# Patient Record
Sex: FEMALE | Race: BLACK OR AFRICAN AMERICAN | NOT HISPANIC OR LATINO | URBAN - METROPOLITAN AREA
[De-identification: names, ages, dates, MRNs, and addresses within clinical notes are randomized per-mention and may not be internally consistent; named-entity substitution may affect disease eponyms.]

---

## 2017-01-06 ENCOUNTER — OFFICE VISIT (OUTPATIENT)
Dept: OBSTETRICS AND GYNECOLOGY | Facility: CLINIC | Age: 27
End: 2017-01-06

## 2017-01-06 VITALS
RESPIRATION RATE: 14 BRPM | DIASTOLIC BLOOD PRESSURE: 72 MMHG | SYSTOLIC BLOOD PRESSURE: 106 MMHG | WEIGHT: 197 LBS | BODY MASS INDEX: 36.03 KG/M2

## 2017-01-06 DIAGNOSIS — Z97.5 CONTRACEPTION, DEVICE INTRAUTERINE: Primary | ICD-10-CM

## 2017-01-06 PROCEDURE — 58300 INSERT INTRAUTERINE DEVICE: CPT | Performed by: OBSTETRICS & GYNECOLOGY

## 2017-01-06 NOTE — PROGRESS NOTES
"IUD Insertion    Patient's last menstrual period was 01/27/2016 (approximate).    Date of procedure:  1/6/2017    Risks and benefits discussed? yes  All questions answered? yes  Consents given by The patient  Written consent obtained? yes    Local anesthesia used:  no    Procedure documentation:    After verifying the patient had a low probability of being pregnant and met the criteria for insertion, a sterile speculum has placed and the cervix was cleansed with an antiseptic solution.  Vaginal discharge was scant.  The anterior lip of the cervix was grasped with a tenaculum and the uterine cavity was gently sounded. There was no difficulty passing the sound through the cervix.  Cervical dilation did not need to be performed prior to placing the IUD.  The uterus was anteverted and sounded to 9 cms.  The Mirena was then prepared per the manufacturers instructions.    The Mirena was advanced to a point 2 cms from the fundus and then the arms were released from the sheath.  The device was advanced to the fundus and the device was released fully from the sheath.. The string was cut 2 cms in length.  Bleeding from the cervix was scant.    She tolerated the procedure without any difficulty.     Post procedure instructions: It was reviewed with [unfilled] that the Mirena will not alter the timing of when she bleeds but it may decrease the quantity of flow and cramps.  Roughly 30% of people will be amenorrheic over time.  Efficacy rate of 99.2% over 5 years was discussed.  Spontaneous expulsion rate of 1-2% was also discussed.  If she has any issue with fever or excessive bleeding or pain she is to call the office immediately.  Otherwise I would like to see her back in 6 weeks with an ultrasound to confirm correct placement.    This note was electronically signed.    Troy Cornell M.D.  January 6, 2017     ADDENDUM    Also, c/o of \"hair bump\" for ~ 1 week on left labia  The following tests were ordered today: HSV.  It " was explained to Swapnil that all lab test should be back within the one week after they are performed. She will be notified about the results, regardless of the findings. If she has not been contacted by the office within 2 weeks after the test has been performed, it is her responsibility to contact us to learn about her results.

## 2017-01-06 NOTE — MR AVS SNAPSHOT
Swapnil Hale   2017 9:50 AM   Office Visit    Dept Phone:  [phone]   Encounter #:  [id number]    Provider:  Troy Cornell MD   Department:  [facility]                Your Full Care Plan              Your Updated Medication List          This list is accurate as of: 17 10:55 AM.  Always use your most recent med list.                CITRANATAL ASSURE 35-1 & 300 MG tablet               You Were Diagnosed With        Codes Comments    Contraception, device intrauterine    -  Primary ICD-10-CM: Z97.5  ICD-9-CM: V45.51       Medications to be Given to You by a Medical Professional     Due       Frequency    (none) levonorgestrel (MIRENA) 20 MCG/24HR IUD  Once      Instructions     None    Patient Instructions History      Upcoming Appointments     Visit Type Date Time Department    IUD 2017  9:50 AM [facility]    GYN FOLLOW UP 2017  9:40 AM [facility]    US JEAN[facility]TE NON-OB TRANSVAGINAL 2017  9:00 AM  [facility]  [facility]      [employer] Signup     [facility] [employer] allows you to send messages to your doctor, view your test results, renew your prescriptions, schedule appointments, and more. To sign up, go to [employer] and click on the Sign Up Now link in the New User? box. Enter your [employer] Activation Code exactly as it appears below along with the last four digits of your Social Security Number and your Date of Birth () to complete the sign-up process. If you do not sign up before the expiration date, you must request a new code.    [employer] Activation Code: [email or web address]  Expires: 2017  5:34 AM    If you have questions, you can email [email or web address] or call [phone] to talk to our [employer] staff. Remember, [employer] is NOT to be used for urgent needs. For medical emergencies, dial 911.               Other Info from Your Visit           Your Appointments     Feb 17, 2017  9:00 AM EST   US jeannette non-ob transvaginal with JEANNET[facility] US 1   [facility]  [facility] [place] ([facility]              No prep. Arrive 30 minutes before your appointment.            Feb 17, 2017  9:40 AM EST   GYN FOLLOW UP with Troy Cornell MD   [facility] [place] (--)    [address]  [facility] [id number] [phone]              Allergies     No Known Allergies      Reason for Visit     Contraception insert of mirena      Vital Signs     Blood Pressure Respirations Weight Last Menstrual Period Breastfeeding? Body Mass Index    106/72 14 197 lb (89.4 kg) 01/27/2016 (Approximate) No 36.03 kg/m2    Smoking Status                   Never Smoker           Problems and Diagnoses Noted     Uses birth control      Medications Administered     levonorgestrel (MIRENA) 20 MCG/24HR IUD

## 2017-01-10 ENCOUNTER — DOCUMENTATION (OUTPATIENT)
Dept: OBSTETRICS AND GYNECOLOGY | Facility: CLINIC | Age: 27
End: 2017-01-10

## 2017-01-11 ENCOUNTER — TELEPHONE (OUTPATIENT)
Dept: OBSTETRICS AND GYNECOLOGY | Facility: CLINIC | Age: 27
End: 2017-01-11

## 2017-01-11 NOTE — TELEPHONE ENCOUNTER
This could be normal.  If he becomes significantly worse, she does need to get in for an ultrasound prior to the 6 weeks as scheduled.  If she has fever, unpredictable bleeding or some other symptoms, she also should be seen for an ultrasound prior to 6 weeks

## 2017-01-11 NOTE — TELEPHONE ENCOUNTER
Pt called.  She has been cramping ever since she had Mirena inserted on January 6th.  She takes Advil and it takes the edge off, but it never takes it totally away.  She sleeps OK and works without problems. She has no bleeding or fever. Is this within normal limits?

## 2017-01-19 ENCOUNTER — TELEPHONE (OUTPATIENT)
Dept: OBSTETRICS AND GYNECOLOGY | Facility: CLINIC | Age: 27
End: 2017-01-19

## 2017-01-19 NOTE — TELEPHONE ENCOUNTER
----- Message from [place] sent at 1/19/2017  2:12 PM EST -----  Regarding: BLEEDING AFTER MIRENA PLACEMENT  Contact: [phone]  DR GIBBONS PT HAD MIRENA PLACED RECENTLY AND SHE HAS BEEN BLEEDING FOR A LITTLE OVER A WEEK AND SOME CRAMPING      I would let her know that a little bit of bleeding and cramping is usually pretty normal and often resolves itself.  If she is having significant issues with cramps, bleeding or fever she needs to be seen to have it evaluated.

## 2017-02-17 ENCOUNTER — OFFICE VISIT (OUTPATIENT)
Dept: OBSTETRICS AND GYNECOLOGY | Facility: CLINIC | Age: 27
End: 2017-02-17

## 2017-02-17 VITALS — BODY MASS INDEX: 35.67 KG/M2 | RESPIRATION RATE: 14 BRPM | WEIGHT: 195 LBS

## 2017-02-17 DIAGNOSIS — Z97.5 CONTRACEPTION, DEVICE INTRAUTERINE: Primary | ICD-10-CM

## 2017-02-17 DIAGNOSIS — N92.6 IRREGULAR MENSES: ICD-10-CM

## 2017-02-17 PROCEDURE — 99213 OFFICE O/P EST LOW 20 MIN: CPT | Performed by: OBSTETRICS & GYNECOLOGY

## 2017-02-17 NOTE — PROGRESS NOTES
Subjective   Chief Complaint   Patient presents with   • Imaging Only     placement of iud     Swapnil Hale is a 26 y.o. year old  presenting to be seen for follow-up of her recently placed Mirena.  Since the time of insertion flow is light but constant.    OTHER COMPLAINTS:  none       Objective   Visit Vitals   • Resp 14   • Wt 195 lb (88.5 kg)   • BMI 35.67 kg/m2       Imaging   Pelvic ultrasound images independantly reviewed - There is an appropriate placed endometrial IUD       Assessment   1. Appropriately placed IUD  2. Irregular menses     Plan   1. Explained to the Mirena will not regulate cycles in somebody who is anovulatory.  2. Offered short course of oral contraceptives to see if this improves flow.  To begin this on .  3. Follow up for annual exam 3 months    New Medications Ordered This Visit   Medications   • levonorgestrel-ethinyl estradiol (ENPRESSE-28) per tablet     Sig: Take 1 tablet by mouth Daily.     Dispense:  28 tablet     Refill:  2          Total time spent today with Swapnil  was 20 minutes (level 3).  Greater than 50% of the time was spent coordinating care, answering her questions and counseling regarding pathophysiology of her presenting problem along with plans for any diagnositc work-up and treatment.    This note was electronically signed.    Troy Cornell M.D.  2017

## 2017-05-04 ENCOUNTER — OFFICE VISIT (OUTPATIENT)
Dept: OBSTETRICS AND GYNECOLOGY | Facility: CLINIC | Age: 27
End: 2017-05-04

## 2017-05-04 VITALS
SYSTOLIC BLOOD PRESSURE: 110 MMHG | WEIGHT: 198.4 LBS | DIASTOLIC BLOOD PRESSURE: 70 MMHG | RESPIRATION RATE: 14 BRPM | HEART RATE: 87 BPM | BODY MASS INDEX: 36.51 KG/M2 | OXYGEN SATURATION: 98 % | HEIGHT: 62 IN

## 2017-05-04 DIAGNOSIS — Z01.419 WELL WOMAN EXAM WITH ROUTINE GYNECOLOGICAL EXAM: Primary | ICD-10-CM

## 2017-05-04 DIAGNOSIS — D50.9 IRON DEFICIENCY ANEMIA, UNSPECIFIED IRON DEFICIENCY ANEMIA TYPE: ICD-10-CM

## 2017-05-04 DIAGNOSIS — Z30.09 GENERAL COUNSELING AND ADVICE ON FEMALE CONTRACEPTION: ICD-10-CM

## 2017-05-04 PROCEDURE — 99395 PREV VISIT EST AGE 18-39: CPT | Performed by: OBSTETRICS & GYNECOLOGY

## 2017-05-15 ENCOUNTER — TELEPHONE (OUTPATIENT)
Dept: OBSTETRICS AND GYNECOLOGY | Facility: CLINIC | Age: 27
End: 2017-05-15

## 2017-05-27 ENCOUNTER — RESULTS ENCOUNTER (OUTPATIENT)
Dept: OBSTETRICS AND GYNECOLOGY | Facility: CLINIC | Age: 27
End: 2017-05-27

## 2017-05-27 DIAGNOSIS — Z01.419 WELL WOMAN EXAM WITH ROUTINE GYNECOLOGICAL EXAM: ICD-10-CM

## 2017-05-31 ENCOUNTER — TELEPHONE (OUTPATIENT)
Dept: OBSTETRICS AND GYNECOLOGY | Facility: CLINIC | Age: 27
End: 2017-05-31

## 2017-06-21 ENCOUNTER — OFFICE VISIT (OUTPATIENT)
Dept: INTERNAL MEDICINE | Facility: CLINIC | Age: 27
End: 2017-06-21

## 2017-06-21 VITALS
SYSTOLIC BLOOD PRESSURE: 108 MMHG | HEIGHT: 63 IN | DIASTOLIC BLOOD PRESSURE: 62 MMHG | WEIGHT: 194.8 LBS | BODY MASS INDEX: 34.52 KG/M2 | TEMPERATURE: 97.6 F

## 2017-06-21 DIAGNOSIS — R63.5 ABNORMAL WEIGHT GAIN: Primary | ICD-10-CM

## 2017-06-21 DIAGNOSIS — H65.02 ACUTE SEROUS OTITIS MEDIA OF LEFT EAR, RECURRENCE NOT SPECIFIED: ICD-10-CM

## 2017-06-21 PROCEDURE — 99203 OFFICE O/P NEW LOW 30 MIN: CPT | Performed by: INTERNAL MEDICINE

## 2017-06-21 RX ORDER — FLUCONAZOLE 150 MG/1
150 TABLET ORAL ONCE
Qty: 1 TABLET | Refills: 0 | Status: SHIPPED | OUTPATIENT
Start: 2017-06-21 | End: 2017-06-21

## 2017-06-21 RX ORDER — AMOXICILLIN AND CLAVULANATE POTASSIUM 875; 125 MG/1; MG/1
1 TABLET, FILM COATED ORAL 2 TIMES DAILY
Qty: 20 TABLET | Refills: 0 | Status: SHIPPED | OUTPATIENT
Start: 2017-06-21 | End: 2017-07-21

## 2017-06-21 RX ORDER — BUPROPION HYDROCHLORIDE 150 MG/1
150 TABLET ORAL DAILY
Qty: 30 TABLET | Refills: 5 | Status: SHIPPED | OUTPATIENT
Start: 2017-06-21 | End: 2018-09-07

## 2017-06-21 NOTE — PROGRESS NOTES
"Subjective   Swapnil Hale is a 26 y.o. female.     History of Present Illness     Here to establish    H/o anemia, though last hct was normal at 40. she is taking a flinstone regualrly    Weight - she feels like her weight fluctuates between 180-200, and has had trouble keeping it off. she was about 160 in highschool. She has been trying to go to  3d/week x several months, and also doing weight watchers through work since about 1/17.  Has lost about 5 lbs so far.    She had her son in 11/16 and has noticed ear on L pulsating sound on and off, not as often now, worse when she was pregnant. since childhood, she had some R hearing loss, though no h/o recurrent infections. L ear has not hurt, but at times she will feel a HA when she has the pulsation. No hearing loss on the L side    The following portions of the patient's history were reviewed and updated as appropriate: allergies, current medications, past family history, past medical history, past social history, past surgical history and problem list.    Review of Systems   Constitutional: Positive for unexpected weight change (unable to lose). Negative for activity change, appetite change and fever.   HENT: Positive for hearing loss (R side) and tinnitus (l ear).    Gastrointestinal:        Sometimes goes back and forth w/ loose stool and constipation   Skin: Negative.    Neurological: Positive for headaches (assocaited w/ her tinnitus).   Psychiatric/Behavioral: Negative.        Objective   Blood pressure 108/62, temperature 97.6 °F (36.4 °C), temperature source Temporal Artery , height 62.6\" (159 cm), weight 194 lb 12.8 oz (88.4 kg), not currently breastfeeding.  Physical Exam   Constitutional: She is oriented to person, place, and time. She appears well-developed and well-nourished. No distress.   HENT:   Head: Normocephalic and atraumatic.   Right Ear: External ear normal.   Left Ear: External ear normal.   Mouth/Throat: No oropharyngeal exudate.   L tm " slightly erythematous. Small amount of cerumen. R tm normal   Eyes: Conjunctivae and EOM are normal. Pupils are equal, round, and reactive to light.   Neck: Normal range of motion. Neck supple. No thyromegaly present.   No bruits   Cardiovascular: Normal rate, regular rhythm and normal heart sounds.  Exam reveals no gallop and no friction rub.    No murmur heard.  Pulmonary/Chest: Effort normal and breath sounds normal. No respiratory distress. She has no wheezes.   Abdominal: Soft. Bowel sounds are normal.   Neurological: She is alert and oriented to person, place, and time.   Skin: Skin is warm and dry. She is not diaphoretic.   Psychiatric: She has a normal mood and affect. Her behavior is normal. Judgment and thought content normal.       Assessment/Plan   Swapnil was seen today for establish care and weight gain.    Diagnoses and all orders for this visit:    Abnormal weight gain - will check thyroid labs. Will try wellbutrin xl, side effects reviewed. Continue working on healthy diet/exercise. Ihandout on healthy weight loss given to pt  -     buPROPion XL (WELLBUTRIN XL) 150 MG 24 hr tablet; Take 1 tablet by mouth Daily.  -     Comprehensive Metabolic Panel; Future  -     TSH; Future  -     T4, Free; Future    Acute serous otitis media of left ear, recurrence not specified - tm is red though pt has only had the tinnitus, not pain. Will do round of antibiotics and have her return in 2-3 weeks and see how TM looks  -     amoxicillin-clavulanate (AUGMENTIN) 875-125 MG per tablet; Take 1 tablet by mouth 2 (Two) Times a Day.  -     Comprehensive Metabolic Panel; Future  -     TSH; Future  -     T4, Free; Future    Other orders - if needed after antibiotic  -     fluconazole (DIFLUCAN) 150 MG tablet; Take 1 tablet by mouth 1 (One) Time for 1 dose.

## 2017-06-29 ENCOUNTER — TELEPHONE (OUTPATIENT)
Dept: OBSTETRICS AND GYNECOLOGY | Facility: CLINIC | Age: 27
End: 2017-06-29

## 2017-07-21 ENCOUNTER — OFFICE VISIT (OUTPATIENT)
Dept: OBSTETRICS AND GYNECOLOGY | Facility: CLINIC | Age: 27
End: 2017-07-21

## 2017-07-21 VITALS
HEART RATE: 67 BPM | OXYGEN SATURATION: 99 % | SYSTOLIC BLOOD PRESSURE: 110 MMHG | DIASTOLIC BLOOD PRESSURE: 70 MMHG | BODY MASS INDEX: 36.66 KG/M2 | RESPIRATION RATE: 14 BRPM | HEIGHT: 62 IN | WEIGHT: 199.2 LBS

## 2017-07-21 DIAGNOSIS — Z11.3 ROUTINE SCREENING FOR STI (SEXUALLY TRANSMITTED INFECTION): Primary | ICD-10-CM

## 2017-07-21 PROCEDURE — 99213 OFFICE O/P EST LOW 20 MIN: CPT | Performed by: OBSTETRICS & GYNECOLOGY

## 2017-07-21 RX ORDER — METRONIDAZOLE 7.5 MG/G
GEL VAGINAL 2 TIMES DAILY
Qty: 70 G | Refills: 1 | Status: SHIPPED | OUTPATIENT
Start: 2017-07-21 | End: 2017-07-26

## 2017-07-21 NOTE — PROGRESS NOTES
Subjective   Chief Complaint   Patient presents with   • Follow-up     Desires STD screen     Swapnil Hale is a 26 y.o. year old  presenting to be seen for evaluation of an abnormal vaginal discharge. The discharge is watery and yellow.  Her symptoms have been present for 6 day(s).  Additional she has noticed local irritation and odor.    Prior to the onset of symptoms she was not on systemic antibiotics.  She has not recently changed soaps/detergents/toilet tissue.  Prior to this visit, she has used nothing in an attempt to improve her symptoms.    SEXUAL Hx:  She is currently sexually active.  In the past year there has not been new sexual partners.    Condoms are not typically used.  She would like to be screened for STD's at today's exam.  Current birth control method: IUD - Mirena.  She is happy with her current method of contraception and does not want to discuss alternative methods of contraception..  MENSTRUAL Hx:  Patient's last menstrual period was 2017 (exact date).  In the past 6 months her cycles have been regular, predictable and occur monthly.   Her menstrual flow is normal.   Each month on average there are roughly  0 days of very heavy flow.    Intermenstrual bleeding is absent.    Post-coital bleeding is absent.  Dysmenorrhea: is not affecting her activities of daily living  PMS: is not affecting her activities of daily living  OTHER COMPLAINTS:  Nothing else    The following portions of the patient's history were reviewed and updated as appropriate:current medications and allergies    Review of Systems  Constitutional POS: nothing reported    NEG: anorexia or night sweats   Gastointestinal POS: nothing reported    NEG: bloating, change in bowel habits, melena or reflux symptoms   Genitourinary POS: per HPI    NEG: dysuria or hematuria   Integument POS: nothing reported    NEG: moles that are changing in size, shape, color or rashes        Objective   /70  Pulse 67  Resp 14  " Ht 62\" (157.5 cm)  Wt 199 lb 3.2 oz (90.4 kg)  LMP 2017 (Exact Date)  SpO2 99%  Breastfeeding? No  BMI 36.43 kg/m2    General:  well developed; well nourished  no acute distress   Skin:  No suspicious lesions seen   Pelvis: Clinical staff was present for exam  External genitalia:  normal appearance of the external genitalia including Bartholin's and [place]'s glands.  :  urethral meatus normal; urethral hypermobility is absent.  Vaginal:  normal pink mucosa without prolapse or lesions. discharge present -  watery, yellow and scant. One swab collected;  Cervix:  normal appearance. IUD string present - 2 cms in length;     Lab Review   No data reviewed    Imaging   No data reviewed       Assessment   1. Vaginitis  2. STI screening     Plan   1. Await culture and lab results for plan of care.       New Medications Ordered This Visit   Medications   • levonorgestrel (MIRENA, 52 MG,) 20 MCG/24HR IUD     Si each by Intrauterine route 1 (One) Time.   • metroNIDAZOLE (METROGEL VAGINAL) 0.75 % vaginal gel     Sig: Insert  into the vagina 2 (Two) Times a Day for 5 days.     Dispense:  70 g     Refill:  1          This note was electronically signed.    Baldo Henry M.D. LINDA  2017    "

## 2017-07-24 ENCOUNTER — TELEPHONE (OUTPATIENT)
Dept: OBSTETRICS AND GYNECOLOGY | Facility: CLINIC | Age: 27
End: 2017-07-24

## 2017-07-24 LAB
HBV SURFACE AG SERPL QL IA: NEGATIVE
HCV AB S/CO SERPL IA: <0.1 S/CO RATIO (ref 0–0.9)
HIV 1+2 AB+HIV1 P24 AG SERPL QL IA: NON REACTIVE
Lab: NORMAL
RPR SER QL: NON REACTIVE

## 2017-07-28 RX ORDER — AZITHROMYCIN 500 MG/1
1000 TABLET, FILM COATED ORAL ONCE
Qty: 2 TABLET | Refills: 0 | Status: SHIPPED | OUTPATIENT
Start: 2017-07-28 | End: 2017-07-28

## 2017-08-31 RX ORDER — METRONIDAZOLE 7.5 MG/G
GEL VAGINAL 2 TIMES DAILY
Qty: 70 G | Refills: 1 | Status: SHIPPED | OUTPATIENT
Start: 2017-08-31 | End: 2017-09-08

## 2017-09-08 ENCOUNTER — OFFICE VISIT (OUTPATIENT)
Dept: OBSTETRICS AND GYNECOLOGY | Facility: CLINIC | Age: 27
End: 2017-09-08

## 2017-09-08 VITALS
SYSTOLIC BLOOD PRESSURE: 110 MMHG | OXYGEN SATURATION: 98 % | HEART RATE: 76 BPM | RESPIRATION RATE: 14 BRPM | HEIGHT: 62 IN | BODY MASS INDEX: 36.99 KG/M2 | DIASTOLIC BLOOD PRESSURE: 72 MMHG | WEIGHT: 201 LBS

## 2017-09-08 DIAGNOSIS — N92.1 MENORRHAGIA WITH IRREGULAR CYCLE: Primary | ICD-10-CM

## 2017-09-08 DIAGNOSIS — Z11.3 SCREEN FOR STD (SEXUALLY TRANSMITTED DISEASE): ICD-10-CM

## 2017-09-08 LAB — HCG INTACT+B SERPL-ACNC: <5 MIU/ML

## 2017-09-08 PROCEDURE — 99213 OFFICE O/P EST LOW 20 MIN: CPT | Performed by: OBSTETRICS & GYNECOLOGY

## 2017-09-13 ENCOUNTER — RESULTS ENCOUNTER (OUTPATIENT)
Dept: OBSTETRICS AND GYNECOLOGY | Facility: CLINIC | Age: 27
End: 2017-09-13

## 2017-09-13 DIAGNOSIS — N92.1 MENORRHAGIA WITH IRREGULAR CYCLE: ICD-10-CM

## 2017-09-14 NOTE — PROGRESS NOTES
"Subjective   Chief Complaint   Patient presents with   • Follow-up     Desires STD screen     Swapnil Hale is a 27 y.o. year old .  Patient's last menstrual period was 2017 (approximate).  She presents to be seen For test of cure for chlamydia.  She states she has not been sexually active since her positive screen.  She also notes irregular bleeding has persisted since her Mirena was placed in February.     OTHER COMPLAINTS:  Nothing else    The following portions of the patient's history were reviewed and updated as appropriate:current medications and allergies    Smoking status: Never Smoker                                                              Smokeless status: Never Used                        Review of Systems  Constitutional POS: nothing reported    NEG: anorexia or night sweats   Gastointestinal POS: nothing reported    NEG: bloating, change in bowel habits, melena or reflux symptoms   Genitourinary POS: see HPI    NEG: dysuria or hematuria   Integument POS: nothing reported    NEG: moles that are changing in size, shape, color or rashes   Breast POS: nothing reported    NEG: persistent breast lump, skin dimpling or nipple discharge         Objective   /72  Pulse 76  Resp 14  Ht 62\" (157.5 cm)  Wt 201 lb (91.2 kg)  LMP 2017 (Approximate) Comment: Mirena  SpO2 98%  Breastfeeding? No  BMI 36.76 kg/m2    General:  well developed; well nourished  no acute distress   Skin:  Not performed.   Thyroid: not examined   Lungs:  breathing is unlabored   Heart:  Not performed.   Breasts:  Not performed.   Abdomen: Not performed.   Pelvis: Clinical staff was present for exam  External genitalia:  normal appearance of the external genitalia including Bartholin's and [place]'s glands.  :  urethral meatus normal;  Vaginal:  normal pink mucosa without prolapse or lesions. One swabs collected  Cervix:  normal appearance. IUD string present - 2.5 cms in length;     Lab Review   No data " reviewed    Imaging   No data reviewed        Assessment   1. STI screening  2. Menorrhagia     Plan   1. Await one swab results for plan of care.  Patient given she reassurance that irregular bleeding can last for up to 1 year with levonorgestrel IUD placement      No orders of the defined types were placed in this encounter.         This note was electronically signed.    Baldo Henry M.D. LINDA  September 14, 2017

## 2017-09-25 ENCOUNTER — TELEPHONE (OUTPATIENT)
Dept: OBSTETRICS AND GYNECOLOGY | Facility: CLINIC | Age: 27
End: 2017-09-25

## 2017-09-25 NOTE — TELEPHONE ENCOUNTER
Dr. Henry patient  [phone] advised patient she needs an appointment. She is scheduled for Friday 9/29/2017 with Dr. Henry.

## 2017-09-28 ENCOUNTER — TELEPHONE (OUTPATIENT)
Dept: OBSTETRICS AND GYNECOLOGY | Facility: CLINIC | Age: 27
End: 2017-09-28

## 2017-09-29 ENCOUNTER — TELEPHONE (OUTPATIENT)
Dept: OBSTETRICS AND GYNECOLOGY | Facility: CLINIC | Age: 27
End: 2017-09-29

## 2017-10-12 ENCOUNTER — OFFICE VISIT (OUTPATIENT)
Dept: OBSTETRICS AND GYNECOLOGY | Facility: CLINIC | Age: 27
End: 2017-10-12

## 2017-10-12 VITALS
DIASTOLIC BLOOD PRESSURE: 72 MMHG | OXYGEN SATURATION: 98 % | HEIGHT: 62 IN | WEIGHT: 208.2 LBS | HEART RATE: 85 BPM | SYSTOLIC BLOOD PRESSURE: 110 MMHG | RESPIRATION RATE: 14 BRPM | BODY MASS INDEX: 38.31 KG/M2

## 2017-10-12 DIAGNOSIS — N76.0 ACUTE VAGINITIS: Primary | ICD-10-CM

## 2017-10-12 PROCEDURE — 99213 OFFICE O/P EST LOW 20 MIN: CPT | Performed by: OBSTETRICS & GYNECOLOGY

## 2017-10-12 RX ORDER — TERCONAZOLE 80 MG/1
80 SUPPOSITORY VAGINAL NIGHTLY
Qty: 3 SUPPOSITORY | Refills: 1 | Status: SHIPPED | OUTPATIENT
Start: 2017-10-12 | End: 2017-11-06

## 2017-10-13 NOTE — PROGRESS NOTES
Subjective   Chief Complaint   Patient presents with   • Follow-up     Desires STD screen and wants to discuss birth control options     Swapnil Hale is a 27 y.o. year old  presenting to be seen for evaluation of an abnormal vaginal discharge. The discharge is yellow, white and thick.  Her symptoms have been present for 2 week(s).  Additional she has noticed dyspareunia, local irritation and vulvar itching.    Prior to the onset of symptoms she was not on systemic antibiotics.  She has not recently changed soaps/detergents/toilet tissue.  Prior to this visit, she has used flagyl, OTC monistat in an attempt to improve her symptoms.    SEXUAL Hx:  She is currently sexually active.  In the past year there has not been new sexual partners.    Condoms are not typically used.  She would not like to be screened for STD's at today's exam.  Current birth control method: IUD - Mirena.  She is happy with her current method of contraception and does not want to discuss alternative methods of contraception..  MENSTRUAL Hx:  Patient's last menstrual period was 2017 (approximate).  In the past 6 months her cycles have been regular, predictable and occur monthly.   Her menstrual flow is normal.   Each month on average there are roughly  0 days of very heavy flow.    Intermenstrual bleeding is absent.    Post-coital bleeding is absent.  Dysmenorrhea: is not affecting her activities of daily living  PMS: is not affecting her activities of daily living  OTHER COMPLAINTS:  Nothing else    The following portions of the patient's history were reviewed and updated as appropriate:current medications and allergies    Review of Systems  Constitutional POS: nothing reported    NEG: anorexia or night sweats   Gastointestinal POS: nothing reported    NEG: bloating, change in bowel habits, melena or reflux symptoms   Genitourinary POS: nothing reported    NEG: dysuria or hematuria   Integument POS: nothing reported    NEG: moles  "that are changing in size, shape, color or rashes        Objective   /72  Pulse 85  Resp 14  Ht 62\" (157.5 cm)  Wt 208 lb 3.2 oz (94.4 kg)  LMP 09/29/2017 (Approximate) Comment: Mirena  SpO2 98%  Breastfeeding? No  BMI 38.08 kg/m2    General:  well developed; well nourished  no acute distress   Skin:  No suspicious lesions seen   Pelvis: Clinical staff was present for exam  External genitalia:  normal appearance of the external genitalia including Bartholin's and [place]'s glands.  :  urethral meatus normal;  Vaginal:  normal pink mucosa without prolapse or lesions. discharge present -  yellow, white, thick and copious. One swabs collected;     Lab Review   No data reviewed    Imaging   No data reviewed       Assessment   1. Vaginal candidiasis     Plan   1. Terazol for treatment. Will RTC as previously scheduled for annual exam or on an as needed basis      New Medications Ordered This Visit   Medications   • terconazole (TERAZOL 3) 80 MG vaginal suppository     Sig: Insert 1 suppository into the vagina Every Night.     Dispense:  3 suppository     Refill:  1          This note was electronically signed.    Baldo Henry M.D. MBA  October 12, 2017      "

## 2017-10-16 ENCOUNTER — TELEPHONE (OUTPATIENT)
Dept: OBSTETRICS AND GYNECOLOGY | Facility: CLINIC | Age: 27
End: 2017-10-16

## 2017-10-17 NOTE — TELEPHONE ENCOUNTER
Dr. Henry patient  [phone] called patient and gave her the results from her STI screen. Which was negative but she was positive for yeast infection. Per Dr. Henry he treated her for yeast at her last appointment. Patient notified

## 2017-11-06 ENCOUNTER — OFFICE VISIT (OUTPATIENT)
Dept: OBSTETRICS AND GYNECOLOGY | Facility: CLINIC | Age: 27
End: 2017-11-06

## 2017-11-06 VITALS
WEIGHT: 205.8 LBS | BODY MASS INDEX: 37.87 KG/M2 | SYSTOLIC BLOOD PRESSURE: 106 MMHG | HEIGHT: 62 IN | DIASTOLIC BLOOD PRESSURE: 72 MMHG | RESPIRATION RATE: 14 BRPM

## 2017-11-06 DIAGNOSIS — Z30.014 ENCOUNTER FOR INITIAL PRESCRIPTION OF INTRAUTERINE CONTRACEPTIVE DEVICE (IUD): ICD-10-CM

## 2017-11-06 DIAGNOSIS — Z30.09 GENERAL COUNSELING AND ADVICE ON FEMALE CONTRACEPTION: Primary | ICD-10-CM

## 2017-11-06 PROCEDURE — 58300 INSERT INTRAUTERINE DEVICE: CPT | Performed by: OBSTETRICS & GYNECOLOGY

## 2017-11-06 PROCEDURE — 58301 REMOVE INTRAUTERINE DEVICE: CPT | Performed by: OBSTETRICS & GYNECOLOGY

## 2017-11-06 RX ORDER — COPPER 313.4 MG/1
INTRAUTERINE DEVICE INTRAUTERINE ONCE
Status: COMPLETED | OUTPATIENT
Start: 2017-11-06 | End: 2017-11-06

## 2017-11-06 RX ADMIN — COPPER 1 EACH: 313.4 INTRAUTERINE DEVICE INTRAUTERINE at 11:53

## 2017-11-13 NOTE — PROGRESS NOTES
IUD Removal and Immediate Reinsertion    Patient's last menstrual period was 10/26/2017 (exact date).    Date of procedure:  11/06/2017    Risks and benefits discussed? yes  All questions answered? yes  Consents given by the patient  Written consent obtained? yes  Reason for removal: Side effect: Menorrhagia    Local anesthesia used:  no    Procedure documentation:    A speculum was placed in order to view the cervix.  A tenaculum did not need to be placed on the anterior cervical lip.  Cervical dilation did not need to be performed in order to access the string.  The IUD string was easily seen.  The string was grasped and the IUD was removed without difficulty.  The IUD did not appear to be adherent to the uterine cavity. It was removed intact.    A sterile speculum was replaced and the cervix was cleansed with an antiseptic solution.  Vaginal discharge was scant.  The anterior lip of the cervix was grasped with a tenaculum and the uterine cavity was gently sounded.  There was no difficulty passing the sound through the cervix.  Cervical dilation did not need to be performed prior to placing the IUD.  The uterus was midposition and sounded to 9 cms.  The ParaGard was then prepared per the manufacturers instructions.    The Paraguard was advanced through the cervical canal until the upper edge of the flange was flush with the external cervix.  The insertion camacho was held in place while the insertion tube was withdrawn.  I waited 10-15 seconds for the arms of the IUS to fully open and the devise to seat in the cavity.  The camacho was removed first followed by the insertion tube.. The string was cut 2 cms in length.  Bleeding from the cervix was scant.    She tolerated the procedure without any difficulty.     Post procedure instructions: It was reviewed that the Paraguard will not alter the timing of when she bleeds but it may increase the quantity of flow and cramps. Efficacy rate of > 98% over the 10 years was  discussed.  Spontaneous expulsion rate of 1-2% was also discussed.  If she has any issue with fever or excessive bleeding or pain she is to call the office immediately.  Otherwise I would like to see her back in 6 weeks with an ultrasound to confirm correct placement    This note was electronically signed.    Baldo Henry M.D. LINDA

## 2017-12-08 ENCOUNTER — OFFICE VISIT (OUTPATIENT)
Dept: OBSTETRICS AND GYNECOLOGY | Facility: CLINIC | Age: 27
End: 2017-12-08

## 2017-12-08 ENCOUNTER — LAB REQUISITION (OUTPATIENT)
Dept: LAB | Facility: HOSPITAL | Age: 27
End: 2017-12-08

## 2017-12-08 VITALS
HEIGHT: 62 IN | DIASTOLIC BLOOD PRESSURE: 80 MMHG | SYSTOLIC BLOOD PRESSURE: 116 MMHG | RESPIRATION RATE: 14 BRPM | OXYGEN SATURATION: 98 % | HEART RATE: 91 BPM | BODY MASS INDEX: 38.9 KG/M2 | WEIGHT: 211.4 LBS

## 2017-12-08 DIAGNOSIS — Z00.00 ROUTINE GENERAL MEDICAL EXAMINATION AT A HEALTH CARE FACILITY: ICD-10-CM

## 2017-12-08 DIAGNOSIS — Z11.3 ROUTINE SCREENING FOR STI (SEXUALLY TRANSMITTED INFECTION): ICD-10-CM

## 2017-12-08 DIAGNOSIS — Z30.431 IUD CHECK UP: Primary | ICD-10-CM

## 2017-12-08 DIAGNOSIS — Z11.3 ENCOUNTER FOR SCREENING FOR INFECTIONS WITH PREDOMINANTLY SEXUAL MODE OF TRANSMISSION: ICD-10-CM

## 2017-12-08 PROCEDURE — 36415 COLL VENOUS BLD VENIPUNCTURE: CPT | Performed by: OBSTETRICS & GYNECOLOGY

## 2017-12-08 PROCEDURE — 99213 OFFICE O/P EST LOW 20 MIN: CPT | Performed by: OBSTETRICS & GYNECOLOGY

## 2017-12-08 NOTE — PROGRESS NOTES
"Subjective   Chief Complaint   Patient presents with   • Follow-up     Paragard IUD string check, yellowish vaginal discharge with odor.     Swapnil Hale is a 27 y.o. year old  presenting to be seen for follow-up of her recently placed ParaGard.  Since the time of insertion flow is typically normal.  She has been sexually active.  Henefer has not been painful for her.   Henefer has not been painful for her partner.    OTHER COMPLAINTS:  Patient desires STI screening       Objective   /80  Pulse 91  Resp 14  Ht 157.5 cm (62\")  Wt 95.9 kg (211 lb 6.4 oz)  LMP 2017 (Exact Date) Comment: Paragard IUD  SpO2 98%  Breastfeeding? No  BMI 38.67 kg/m2    Imaging   No data reviewed       Assessment   1. IUD surveillance  2. STI screening     Plan   1. Patient satisfied with ParaGard as a contraceptive choice.  Await STI labs for plan of care.  Patient will return to clinic in 6 months for her annual exam and Pap smear      New Medications Ordered This Visit   Medications   • PARAGARD INTRAUTERINE COPPER IU     Sig: by Intrauterine route.          Total time spent today with Swapnil  was 20 minutes (level 3).  Greater than 50% of the time was spent coordinating care, answering her questions and counseling regarding pathophysiology of her presenting problem along with plans for any diagnositc work-up and treatment.    This note was electronically signed.    MD SERGIO AshrafA  2017  "

## 2017-12-09 LAB
HCV AB S/CO SERPL IA: <0.1 S/CO RATIO (ref 0–0.9)
HIV 1+2 AB+HIV1 P24 AG SERPL QL IA: NON REACTIVE
RHEUMATOID FACT SERPL-ACNC: <10 IU/ML (ref 0–13.9)
RPR SER QL: NON REACTIVE

## 2017-12-13 ENCOUNTER — TELEPHONE (OUTPATIENT)
Dept: OBSTETRICS AND GYNECOLOGY | Facility: CLINIC | Age: 27
End: 2017-12-13

## 2017-12-13 RX ORDER — METRONIDAZOLE 7.5 MG/G
GEL VAGINAL
Qty: 1 TUBE | Refills: 0 | Status: SHIPPED | OUTPATIENT
Start: 2017-12-13 | End: 2018-04-13

## 2017-12-13 NOTE — TELEPHONE ENCOUNTER
[phone] Dr. Henry patient calling requesting results from her STD screen. STD screen is negative but she has BV I will send in a prescription for Metrogel to her pharmacy. Patient notified, E-Rx sent to pharmacy.

## 2017-12-13 NOTE — TELEPHONE ENCOUNTER
DR FONSECA PATIENT    WOULD LIKE BLOOD WORK RESULTS FROM 12/8/17 VISIT WITH DR FONSECA    PLEASE CALL [phone]

## 2018-04-13 ENCOUNTER — OFFICE VISIT (OUTPATIENT)
Dept: OBSTETRICS AND GYNECOLOGY | Facility: CLINIC | Age: 28
End: 2018-04-13

## 2018-04-13 VITALS
WEIGHT: 212.2 LBS | HEART RATE: 96 BPM | DIASTOLIC BLOOD PRESSURE: 70 MMHG | RESPIRATION RATE: 14 BRPM | SYSTOLIC BLOOD PRESSURE: 102 MMHG | OXYGEN SATURATION: 99 % | HEIGHT: 62 IN | BODY MASS INDEX: 39.05 KG/M2

## 2018-04-13 DIAGNOSIS — N92.3 VAGINAL BLEEDING BETWEEN PERIODS: Primary | ICD-10-CM

## 2018-04-13 PROCEDURE — 99213 OFFICE O/P EST LOW 20 MIN: CPT | Performed by: OBSTETRICS & GYNECOLOGY

## 2018-04-13 NOTE — PROGRESS NOTES
"Subjective   Chief Complaint   Patient presents with   • Follow-up     Spotting bright red and irritated for about 2 weeks     Swapnil Hale is a 27 y.o. year old .  Patient's last menstrual period was 2018 (exact date).  She presents to be seen for 2 wks irreg, intermittent spotting. Bright red to brown in color. No clots or cramping.     OTHER COMPLAINTS:  Nothing else    The following portions of the patient's history were reviewed and updated as appropriate:current medications and allergies    Smoking status: Never Smoker                                                              Smokeless tobacco: Never Used                        Review of Systems  Constitutional POS: nothing reported    NEG: anorexia or night sweats   Gastointestinal POS: nothing reported    NEG: bloating, change in bowel habits, melena or reflux symptoms   Genitourinary POS: see HPI    NEG: dysuria or hematuria   Integument POS: nothing reported    NEG: moles that are changing in size, shape, color or rashes   Breast POS: nothing reported    NEG: persistent breast lump, skin dimpling or nipple discharge         Objective   /70   Pulse 96   Resp 14   Ht 157.5 cm (62\")   Wt 96.3 kg (212 lb 3.2 oz)   LMP 2018 (Exact Date) Comment: Paragard IUD  SpO2 99%   Breastfeeding? No   BMI 38.81 kg/m²     General:  well developed; well nourished  no acute distress   Skin:  No suspicious lesions seen   Thyroid: normal to inspection and palpation   Lungs:  breathing is unlabored   Heart:  regular rate and rhythm, S1, S2 normal, no murmur, click, rub or gallop   Breasts:  Not performed.   Abdomen: soft, non-tender; no masses  no umbilical or inginual hernias are present  no hepato-splenomegaly   Pelvis: Clinical staff was present for exam  External genitalia:  normal appearance of the external genitalia including Bartholin's and [place]'s glands.  :  urethral meatus normal;  Vaginal:  normal pink mucosa without prolapse " or lesions. blood present -  small amount and dark red;  Cervix:  normal appearance. blood is seen coming from external cervical os;     Lab Review   No data reviewed    Imaging   No data reviewed        Assessment   1. Breakthrough bleeding     Plan   1. Await ultrasound results for plan of care.      No orders of the defined types were placed in this encounter.         This note was electronically signed.    Baldo Henry M.D. LINDA

## 2018-04-18 ENCOUNTER — TELEPHONE (OUTPATIENT)
Dept: OBSTETRICS AND GYNECOLOGY | Facility: CLINIC | Age: 28
End: 2018-04-18

## 2018-04-18 NOTE — TELEPHONE ENCOUNTER
Provider Name  Dr Henry    Reason for Call  Wants urine and vaginal swab results    Pharmacy Name  [facility] [address]    Call Back Number  [phone]

## 2018-04-18 NOTE — TELEPHONE ENCOUNTER
Dr. Henry patient  [phone] returned patient's call and advised her that Dr. Henry did not do a vaginal swab on her. Dr. Henry just did a speculum and bi-manual exam. Also informed patient that someone had discarded her urine sample and if she's still having problems she can just stop by the office without an appointment to leave another sample. Patient states she has an appointment next week she will just wait until her appointment. I advised patient if her symptoms get worse for her to just come by office before her next appointment. Patient verbalized understanding.

## 2018-04-27 ENCOUNTER — OFFICE VISIT (OUTPATIENT)
Dept: OBSTETRICS AND GYNECOLOGY | Facility: CLINIC | Age: 28
End: 2018-04-27

## 2018-04-27 VITALS
HEIGHT: 62 IN | RESPIRATION RATE: 14 BRPM | HEART RATE: 69 BPM | DIASTOLIC BLOOD PRESSURE: 70 MMHG | WEIGHT: 213.8 LBS | OXYGEN SATURATION: 98 % | SYSTOLIC BLOOD PRESSURE: 104 MMHG | BODY MASS INDEX: 39.34 KG/M2

## 2018-04-27 DIAGNOSIS — B37.9 CANDIDIASIS: ICD-10-CM

## 2018-04-27 DIAGNOSIS — R30.0 DYSURIA: Primary | ICD-10-CM

## 2018-04-27 PROCEDURE — 87086 URINE CULTURE/COLONY COUNT: CPT | Performed by: OBSTETRICS & GYNECOLOGY

## 2018-04-27 PROCEDURE — 99213 OFFICE O/P EST LOW 20 MIN: CPT | Performed by: OBSTETRICS & GYNECOLOGY

## 2018-04-27 RX ORDER — FLUCONAZOLE 150 MG/1
150 TABLET ORAL DAILY
Qty: 1 TABLET | Refills: 2 | Status: SHIPPED | OUTPATIENT
Start: 2018-04-27 | End: 2018-09-07

## 2018-04-29 LAB
BACTERIA SPEC AEROBE CULT: NORMAL
BACTERIA SPEC AEROBE CULT: NORMAL

## 2018-05-14 ENCOUNTER — TELEPHONE (OUTPATIENT)
Dept: OBSTETRICS AND GYNECOLOGY | Facility: CLINIC | Age: 28
End: 2018-05-14

## 2018-05-16 RX ORDER — METRONIDAZOLE 7.5 MG/G
GEL VAGINAL 2 TIMES DAILY
Qty: 70 G | Refills: 3 | Status: SHIPPED | OUTPATIENT
Start: 2018-05-16 | End: 2018-09-07

## 2018-07-09 ENCOUNTER — OFFICE VISIT (OUTPATIENT)
Dept: OBSTETRICS AND GYNECOLOGY | Facility: CLINIC | Age: 28
End: 2018-07-09

## 2018-07-09 VITALS
WEIGHT: 210.4 LBS | SYSTOLIC BLOOD PRESSURE: 108 MMHG | RESPIRATION RATE: 14 BRPM | HEIGHT: 62 IN | BODY MASS INDEX: 38.72 KG/M2 | DIASTOLIC BLOOD PRESSURE: 72 MMHG

## 2018-07-09 DIAGNOSIS — Z30.432 ENCOUNTER FOR REMOVAL OF INTRAUTERINE CONTRACEPTIVE DEVICE (IUD): Primary | ICD-10-CM

## 2018-07-09 PROCEDURE — 58301 REMOVE INTRAUTERINE DEVICE: CPT | Performed by: OBSTETRICS & GYNECOLOGY

## 2018-07-09 RX ORDER — NORGESTIMATE AND ETHINYL ESTRADIOL 0.25-0.035
1 KIT ORAL DAILY
Qty: 28 TABLET | Refills: 12 | Status: SHIPPED | OUTPATIENT
Start: 2018-07-09 | End: 2018-11-16

## 2018-07-09 NOTE — PROGRESS NOTES
IUD Removal    Date of procedure:  7/9/2018    Risks and benefits discussed? yes  All questions answered? yes  Consents given by The patient  Written consent obtained? yes  Reason for removal: Side effect: Menorrhagia    Local anesthesia used:  no    Procedure documentation:    A speculum was placed in order to view the cervix.  A tenaculum did not need to be placed on the anterior cervical lip.  Cervical dilation did not need to be performed in order to access the string.  The IUD string was easily seen.  The string was grasped and the IUD was removed without difficulty.  The IUD did not appear to be adherent to the uterine cavity. It was removed intact.    She tolerated the procedure without any difficulty.     Post procedure instructions: Patient notified to call with heavy bleeding, fever or increasing pain.    Follow up for annual exam 3 week    This note was electronically signed.    Baldo Henry M.D. LINDA.  July 9, 2018

## 2018-07-27 ENCOUNTER — OFFICE VISIT (OUTPATIENT)
Dept: OBSTETRICS AND GYNECOLOGY | Facility: CLINIC | Age: 28
End: 2018-07-27

## 2018-07-27 VITALS
RESPIRATION RATE: 14 BRPM | HEIGHT: 62 IN | WEIGHT: 212.4 LBS | SYSTOLIC BLOOD PRESSURE: 110 MMHG | BODY MASS INDEX: 39.08 KG/M2 | DIASTOLIC BLOOD PRESSURE: 80 MMHG

## 2018-07-27 DIAGNOSIS — Z01.419 WELL WOMAN EXAM WITH ROUTINE GYNECOLOGICAL EXAM: Primary | ICD-10-CM

## 2018-07-27 PROCEDURE — 99395 PREV VISIT EST AGE 18-39: CPT | Performed by: OBSTETRICS & GYNECOLOGY

## 2018-07-27 RX ORDER — FLUCONAZOLE 200 MG/1
TABLET ORAL
COMMUNITY
Start: 2018-07-24 | End: 2018-09-07

## 2018-07-27 RX ORDER — AMOXICILLIN AND CLAVULANATE POTASSIUM 875; 125 MG/1; MG/1
TABLET, FILM COATED ORAL
COMMUNITY
Start: 2018-07-24 | End: 2018-09-07

## 2018-07-27 RX ORDER — CYANOCOBALAMIN 1000 UG/ML
INJECTION, SOLUTION INTRAMUSCULAR; SUBCUTANEOUS
COMMUNITY
Start: 2018-07-19 | End: 2019-05-21

## 2018-07-27 RX ORDER — ERGOCALCIFEROL 1.25 MG/1
CAPSULE ORAL
COMMUNITY
Start: 2018-07-19 | End: 2020-01-15

## 2018-09-07 ENCOUNTER — OFFICE VISIT (OUTPATIENT)
Dept: INTERNAL MEDICINE | Facility: CLINIC | Age: 28
End: 2018-09-07

## 2018-09-07 VITALS
SYSTOLIC BLOOD PRESSURE: 106 MMHG | TEMPERATURE: 97.2 F | WEIGHT: 212 LBS | HEIGHT: 62 IN | HEART RATE: 67 BPM | BODY MASS INDEX: 39.01 KG/M2 | DIASTOLIC BLOOD PRESSURE: 68 MMHG | OXYGEN SATURATION: 98 %

## 2018-09-07 DIAGNOSIS — J30.2 ACUTE SEASONAL ALLERGIC RHINITIS, UNSPECIFIED TRIGGER: ICD-10-CM

## 2018-09-07 DIAGNOSIS — R12 HEARTBURN: ICD-10-CM

## 2018-09-07 DIAGNOSIS — J02.9 SORE THROAT: Primary | ICD-10-CM

## 2018-09-07 PROCEDURE — 99214 OFFICE O/P EST MOD 30 MIN: CPT | Performed by: NURSE PRACTITIONER

## 2018-09-07 RX ORDER — OMEPRAZOLE 20 MG/1
20 TABLET, DELAYED RELEASE ORAL DAILY
Qty: 30 TABLET | Refills: 0 | Status: SHIPPED | OUTPATIENT
Start: 2018-09-07 | End: 2019-05-21

## 2018-09-07 RX ORDER — LEVOCETIRIZINE DIHYDROCHLORIDE 5 MG/1
5 TABLET, FILM COATED ORAL EVERY EVENING
Qty: 30 TABLET | Refills: 3 | Status: SHIPPED | OUTPATIENT
Start: 2018-09-07 | End: 2019-05-21

## 2018-09-07 NOTE — PROGRESS NOTES
Subjective   Swapnil Hale is a 28 y.o. female.     Sore Throat    This is a new problem. The current episode started 1 to 4 weeks ago (2 weeks). The problem has been unchanged (slightly worse at night). Neither side of throat is experiencing more pain than the other. There has been no fever. The pain is mild. Associated symptoms include trouble swallowing. Pertinent negatives include no abdominal pain, congestion, coughing, diarrhea, drooling, ear discharge, ear pain, headaches, hoarse voice, plugged ear sensation, neck pain, shortness of breath, stridor, swollen glands or vomiting. She has had no exposure to strep or mono. Treatments tried: benadryl, claritin, sinus colod med. The treatment provided no relief.   Heartburn   She complains of heartburn and a sore throat. She reports no abdominal pain, no belching, no chest pain, no choking, no coughing, no dysphagia, no early satiety, no globus sensation, no hoarse voice, no nausea, no stridor, no tooth decay, no water brash or no wheezing. This is a new problem. The current episode started 1 to 4 weeks ago. The problem occurs frequently. The problem has been unchanged. The heartburn is located in the substernum. The heartburn is of mild intensity. The heartburn does not wake her from sleep. The heartburn does not limit her activity. The heartburn doesn't change with position. The symptoms are aggravated by certain foods. Pertinent negatives include no anemia, fatigue, melena, muscle weakness, orthopnea or weight loss. Risk factors include obesity and caffeine use.      Pt has done 2 strep test on herself in the last 2 weeks and they were both negative. (works in medical office)    The following portions of the patient's history were reviewed and updated as appropriate: allergies, current medications, past family history, past medical history, past social history, past surgical history and problem list.    Review of Systems   Constitutional: Negative for appetite  change, chills, diaphoresis, fatigue, fever, unexpected weight change and weight loss.   HENT: Positive for rhinorrhea, sore throat and trouble swallowing. Negative for congestion, drooling, ear discharge, ear pain, hearing loss, hoarse voice, postnasal drip, sinus pain, sinus pressure and sneezing. Tinnitus: tenderness with swallowing.    Eyes: Negative for pain, redness and itching.   Respiratory: Negative for cough, choking, chest tightness, shortness of breath, wheezing and stridor.    Cardiovascular: Negative for chest pain and palpitations.   Gastrointestinal: Positive for heartburn. Negative for abdominal distention, abdominal pain, diarrhea, dysphagia, melena, nausea and vomiting.        Patient experiencing heartburn/acid reflux     Musculoskeletal: Negative for muscle weakness and neck pain.   Allergic/Immunologic: Positive for environmental allergies.   Neurological: Negative for headaches.       Objective   Physical Exam   Constitutional: She appears well-developed and well-nourished. No distress.   HENT:   Head: Normocephalic and atraumatic.   Right Ear: External ear normal.   Left Ear: External ear normal.   Nose: Mucosal edema (boggy) and rhinorrhea present.   Mouth/Throat: Posterior oropharyngeal erythema (cobblestone appearance) present. No oropharyngeal exudate or posterior oropharyngeal edema.   Eyes: Pupils are equal, round, and reactive to light. Conjunctivae are normal.   Neck: Neck supple. No thyromegaly present.   Cardiovascular: Normal rate, regular rhythm and normal heart sounds.    Pulmonary/Chest: Effort normal and breath sounds normal.   Abdominal: Soft. Bowel sounds are normal. She exhibits no distension and no mass. There is no tenderness. There is no rebound and no guarding. No hernia.   Skin: Skin is warm and dry. She is not diaphoretic.   Psychiatric: She has a normal mood and affect. Her behavior is normal.   Nursing note and vitals reviewed.      Assessment/Plan      Swapnil was  seen today for sore throat.    Diagnoses and all orders for this visit:    Sore throat    Heartburn  -     omeprazole OTC (PRILOSEC OTC) 20 MG EC tablet; Take 1 tablet by mouth Daily.    Acute seasonal allergic rhinitis, unspecified trigger  -     levocetirizine (XYZAL) 5 MG tablet; Take 1 tablet by mouth Every Evening.    Other orders  -     Cancel: POCT rapid strep A      Tylenol OTC if needed per bottle instructions  Warm salt water gargles.   Meds as above, AE discussed  GERD precautions: avoid trigger foods, small frequent meals, sit up past meals, no tight clothing over abdomen....  Return if symptoms worsen or fail to improve.  Plan of care discussed with pt. They verbalized understanding and agreement.

## 2018-10-02 ENCOUNTER — LAB REQUISITION (OUTPATIENT)
Dept: LAB | Facility: HOSPITAL | Age: 28
End: 2018-10-02

## 2018-10-02 ENCOUNTER — TELEPHONE (OUTPATIENT)
Dept: OBSTETRICS AND GYNECOLOGY | Facility: CLINIC | Age: 28
End: 2018-10-02

## 2018-10-02 DIAGNOSIS — Z34.90 PREGNANCY, UNSPECIFIED GESTATIONAL AGE: Primary | ICD-10-CM

## 2018-10-02 DIAGNOSIS — Z00.00 ROUTINE GENERAL MEDICAL EXAMINATION AT A HEALTH CARE FACILITY: ICD-10-CM

## 2018-10-02 LAB — HCG INTACT+B SERPL-ACNC: <5 MIU/ML

## 2018-10-02 PROCEDURE — 84702 CHORIONIC GONADOTROPIN TEST: CPT

## 2018-10-02 NOTE — TELEPHONE ENCOUNTER
Carl    Pt wants an HCG order sent to lab. She took pregnancy test yesterday, which showed a faint line. This morning she was bleeding and passed a big clot, and another test showed negative. Will you send order to the lab for hcg? Let pt know

## 2018-11-16 ENCOUNTER — OFFICE VISIT (OUTPATIENT)
Dept: INTERNAL MEDICINE | Facility: CLINIC | Age: 28
End: 2018-11-16

## 2018-11-16 VITALS
BODY MASS INDEX: 38.57 KG/M2 | SYSTOLIC BLOOD PRESSURE: 90 MMHG | WEIGHT: 209.6 LBS | HEIGHT: 62 IN | OXYGEN SATURATION: 98 % | TEMPERATURE: 99.1 F | DIASTOLIC BLOOD PRESSURE: 60 MMHG | HEART RATE: 67 BPM

## 2018-11-16 DIAGNOSIS — R51.9 HEADACHE, UNSPECIFIED HEADACHE TYPE: ICD-10-CM

## 2018-11-16 DIAGNOSIS — R41.3 MEMORY DISTURBANCE: ICD-10-CM

## 2018-11-16 DIAGNOSIS — R53.83 FATIGUE, UNSPECIFIED TYPE: ICD-10-CM

## 2018-11-16 DIAGNOSIS — E04.1 THYROID NODULE: ICD-10-CM

## 2018-11-16 DIAGNOSIS — H93.A9 PULSATILE TINNITUS: ICD-10-CM

## 2018-11-16 DIAGNOSIS — R27.8 DYSGRAPHIA: Primary | ICD-10-CM

## 2018-11-16 LAB
ALBUMIN SERPL-MCNC: 4.11 G/DL (ref 3.2–4.8)
ALBUMIN/GLOB SERPL: 1.4 G/DL (ref 1.5–2.5)
ALP SERPL-CCNC: 68 U/L (ref 25–100)
ALT SERPL-CCNC: 10 U/L (ref 7–40)
AST SERPL-CCNC: 17 U/L (ref 0–33)
BASOPHILS # BLD AUTO: 0.03 10*3/MM3 (ref 0–0.2)
BASOPHILS NFR BLD AUTO: 0.3 % (ref 0–1)
BILIRUB SERPL-MCNC: 0.4 MG/DL (ref 0.3–1.2)
BUN SERPL-MCNC: 10 MG/DL (ref 9–23)
BUN/CREAT SERPL: 10.9 (ref 7–25)
CALCIUM SERPL-MCNC: 9.4 MG/DL (ref 8.7–10.4)
CHLORIDE SERPL-SCNC: 104 MMOL/L (ref 99–109)
CO2 SERPL-SCNC: 30 MMOL/L (ref 20–31)
CREAT SERPL-MCNC: 0.92 MG/DL (ref 0.6–1.3)
EOSINOPHIL # BLD AUTO: 0.37 10*3/MM3 (ref 0–0.3)
EOSINOPHIL NFR BLD AUTO: 3.8 % (ref 0–3)
ERYTHROCYTE [DISTWIDTH] IN BLOOD BY AUTOMATED COUNT: 12.8 % (ref 11.3–14.5)
GLOBULIN SER CALC-MCNC: 2.9 GM/DL
GLUCOSE SERPL-MCNC: 87 MG/DL (ref 70–100)
HCT VFR BLD AUTO: 39.2 % (ref 34.5–44)
HGB BLD-MCNC: 12.3 G/DL (ref 11.5–15.5)
IMM GRANULOCYTES # BLD: 0.01 10*3/MM3 (ref 0–0.03)
IMM GRANULOCYTES NFR BLD: 0.1 % (ref 0–0.6)
LYMPHOCYTES # BLD AUTO: 3.05 10*3/MM3 (ref 0.6–4.8)
LYMPHOCYTES NFR BLD AUTO: 31.6 % (ref 24–44)
MCH RBC QN AUTO: 25.7 PG (ref 27–31)
MCHC RBC AUTO-ENTMCNC: 31.4 G/DL (ref 32–36)
MCV RBC AUTO: 82 FL (ref 80–99)
MONOCYTES # BLD AUTO: 0.68 10*3/MM3 (ref 0–1)
MONOCYTES NFR BLD AUTO: 7.1 % (ref 0–12)
NEUTROPHILS # BLD AUTO: 5.5 10*3/MM3 (ref 1.5–8.3)
NEUTROPHILS NFR BLD AUTO: 57.1 % (ref 41–71)
PLATELET # BLD AUTO: 242 10*3/MM3 (ref 150–450)
POTASSIUM SERPL-SCNC: 4 MMOL/L (ref 3.5–5.5)
PROT SERPL-MCNC: 7 G/DL (ref 5.7–8.2)
RBC # BLD AUTO: 4.78 10*6/MM3 (ref 3.89–5.14)
SODIUM SERPL-SCNC: 138 MMOL/L (ref 132–146)
T4 FREE SERPL-MCNC: 1.08 NG/DL (ref 0.89–1.76)
TSH SERPL DL<=0.005 MIU/L-ACNC: 0.8 MIU/ML (ref 0.35–5.35)
VIT B12 SERPL-MCNC: 658 PG/ML (ref 211–911)
WBC # BLD AUTO: 9.64 10*3/MM3 (ref 3.5–10.8)

## 2018-11-16 PROCEDURE — 99214 OFFICE O/P EST MOD 30 MIN: CPT | Performed by: INTERNAL MEDICINE

## 2018-11-20 ENCOUNTER — OFFICE VISIT (OUTPATIENT)
Dept: OBSTETRICS AND GYNECOLOGY | Facility: CLINIC | Age: 28
End: 2018-11-20

## 2018-11-20 ENCOUNTER — LAB REQUISITION (OUTPATIENT)
Dept: LAB | Facility: HOSPITAL | Age: 28
End: 2018-11-20

## 2018-11-20 VITALS
RESPIRATION RATE: 14 BRPM | WEIGHT: 212.2 LBS | DIASTOLIC BLOOD PRESSURE: 68 MMHG | SYSTOLIC BLOOD PRESSURE: 106 MMHG | HEIGHT: 62 IN | BODY MASS INDEX: 39.05 KG/M2

## 2018-11-20 DIAGNOSIS — Z00.00 ROUTINE GENERAL MEDICAL EXAMINATION AT A HEALTH CARE FACILITY: ICD-10-CM

## 2018-11-20 DIAGNOSIS — Z11.3 ENCOUNTER FOR SCREENING FOR INFECTIONS WITH PREDOMINANTLY SEXUAL MODE OF TRANSMISSION: ICD-10-CM

## 2018-11-20 DIAGNOSIS — N91.4 SECONDARY OLIGOMENORRHEA: ICD-10-CM

## 2018-11-20 DIAGNOSIS — Z11.3 ROUTINE SCREENING FOR STI (SEXUALLY TRANSMITTED INFECTION): Primary | ICD-10-CM

## 2018-11-20 PROCEDURE — 36415 COLL VENOUS BLD VENIPUNCTURE: CPT | Performed by: OBSTETRICS & GYNECOLOGY

## 2018-11-20 PROCEDURE — 99213 OFFICE O/P EST LOW 20 MIN: CPT | Performed by: OBSTETRICS & GYNECOLOGY

## 2018-11-23 ENCOUNTER — APPOINTMENT (OUTPATIENT)
Dept: ULTRASOUND IMAGING | Facility: HOSPITAL | Age: 28
End: 2018-11-23
Attending: INTERNAL MEDICINE

## 2018-11-25 LAB
17OHP SERPL-MCNC: 141 NG/DL (ref 27–199)
25(OH)D3+25(OH)D2 SERPL-MCNC: 13.5 NG/ML (ref 30–100)
DHEA SERPL-MCNC: 211 NG/DL (ref 31–701)
DHEA-S SERPL-MCNC: 112 UG/DL (ref 138.5–475.2)
ESTRADIOL SERPL-MCNC: 120.4 PG/ML (ref 7.6–42.6)
FSH SERPL-ACNC: 5.2 MIU/ML (ref 1.5–12.4)
HBA1C MFR BLD: 5.3 % (ref 4.8–5.6)
HBV SURFACE AG SERPL QL IA: NEGATIVE
HCG INTACT+B SERPL-ACNC: <1 MIU/ML (ref 0–3)
HCV AB S/CO SERPL IA: 0.2 S/CO RATIO (ref 0–0.9)
HIV 1+2 AB+HIV1 P24 AG SERPL QL IA: NON REACTIVE
LH SERPL-ACNC: 17.9 MIU/ML (ref 1.7–8.6)
MIS SERPL-MCNC: 9.99 NG/ML
PROGEST SERPL-MCNC: 1.1 NG/ML (ref 0–0.5)
RPR SER QL: NON REACTIVE
TESTOST FREE SERPL-MCNC: 2.3 PG/ML (ref 9.3–26.5)
TESTOST SERPL-MCNC: 53 NG/DL (ref 264–916)

## 2018-11-26 ENCOUNTER — TELEPHONE (OUTPATIENT)
Dept: OBSTETRICS AND GYNECOLOGY | Facility: CLINIC | Age: 28
End: 2018-11-26

## 2018-11-29 ENCOUNTER — TELEPHONE (OUTPATIENT)
Dept: OBSTETRICS AND GYNECOLOGY | Facility: CLINIC | Age: 28
End: 2018-11-29

## 2018-11-29 RX ORDER — METRONIDAZOLE 7.5 MG/G
GEL VAGINAL 2 TIMES DAILY
Qty: 1 TUBE | Refills: 0 | Status: SHIPPED | OUTPATIENT
Start: 2018-11-29 | End: 2018-12-04

## 2018-11-29 NOTE — TELEPHONE ENCOUNTER
----- Message from Baldo Henry MD sent at 11/26/2018  6:49 PM EST -----  Her culture was positive for BV.  Please order MetroGel ×5 nights

## 2018-11-30 RX ORDER — SPIRONOLACTONE 50 MG/1
50 TABLET, FILM COATED ORAL 2 TIMES DAILY
Qty: 60 TABLET | Refills: 2 | Status: SHIPPED | OUTPATIENT
Start: 2018-11-30 | End: 2019-10-21

## 2019-03-06 ENCOUNTER — TELEPHONE (OUTPATIENT)
Dept: INTERNAL MEDICINE | Facility: CLINIC | Age: 29
End: 2019-03-06

## 2019-03-06 DIAGNOSIS — R27.8 DYSGRAPHIA: ICD-10-CM

## 2019-03-06 DIAGNOSIS — H93.A9 PULSATILE TINNITUS: Primary | ICD-10-CM

## 2019-03-06 DIAGNOSIS — E04.9 THYROID ENLARGEMENT: ICD-10-CM

## 2019-03-06 DIAGNOSIS — R42 DIZZINESS: ICD-10-CM

## 2019-03-06 NOTE — TELEPHONE ENCOUNTER
Yes, I will put in new orders.  If she can schedule follow-up a few days after the test are done and we can go over the results

## 2019-03-06 NOTE — TELEPHONE ENCOUNTER
Patient called and said she was scheduled for a brain MRI and US of thyroid--there were insurance issues at the time and wants to know if she needs to come back in or if orders are still good.  Patients -[phone]

## 2019-03-11 ENCOUNTER — TELEPHONE (OUTPATIENT)
Dept: INTERNAL MEDICINE | Facility: CLINIC | Age: 29
End: 2019-03-11

## 2019-03-11 NOTE — TELEPHONE ENCOUNTER
Left message for patient to call us with insurance information. Unable to authorize MRI due to patient no longer having [employer] insurance [facility] help. Last visit in office was 11/2018 which is our last insurance entry for patient.  Once we have the proper insurance information we will be able to continue with authorization for MRI.

## 2019-03-20 NOTE — PROGRESS NOTES
Subjective   Chief Complaint   Patient presents with   • Gynecologic Exam     No complaints     Swapnil Hale is a 27 y.o. year old  presenting to be seen for her annual exam.     SEXUAL Hx:  She is currently sexually active.  In the past year there has not been new sexual partners.    Condoms ARE typically used.  She would not like to be screened for STD's at today's exam.  Current birth control method: OCP (estrogen/progesterone).  She is happy with her current method of contraception and does not want to discuss alternative methods of contraception.  MENSTRUAL Hx:  Patient's last menstrual period was 2018 (exact date).  In the past 6 months her cycles have been regular, predictable and occur monthly.  Her menstrual flow is normal.   Each month on average there are roughly 0 day(s) of very heavy flow.    Intermenstrual bleeding is absent.    Post-coital bleeding is absent.  Dysmenorrhea: is not affecting her activities of daily living  PMS: is not affecting her activities of daily living  Her cycles are not a source of concern for her that she wishes to discuss today.  HEALTH Hx:  She exercises regularly: no (and has no plans to become more active).  She wears her seat belt: yes.  She has concerns about domestic violence: no.  OTHER COMPLAINTS:  Nothing else    The following portions of the patient's history were reviewed and updated as appropriate:problem list, current medications, allergies, past family history, past medical history, past social history and past surgical history.    Smoking status: Never Smoker                                                              Smokeless tobacco: Never Used                        Review of Systems  Constitutional POS: nothing reported    NEG: anorexia or night sweats   Gastointestinal POS: nothing reported    NEG: bloating, change in bowel habits, melena or reflux symptoms   Genitourinary POS: nothing reported    NEG: dysuria or hematuria   Integument  "POS: nothing reported    NEG: moles that are changing in size, shape, color or rashes   Breast POS: nothing reported    NEG: persistent breast lump, skin dimpling or nipple discharge        Objective   /80   Resp 14   Ht 157.5 cm (62\")   Wt 96.3 kg (212 lb 6.4 oz)   LMP 06/26/2018 (Exact Date)   Breastfeeding? No   BMI 38.85 kg/m²     General:  well developed; well nourished  no acute distress   Skin:  No suspicious lesions seen   Thyroid: normal to inspection and palpation   Breasts:  Examined in supine position  Symmetric without masses or skin dimpling  Nipples normal without inversion, lesions or discharge  There are no palpable axillary nodes  Nipple piercings are present bilaterally   Abdomen: soft, non-tender; no masses  no umbilical or inginual hernias are present  no hepato-splenomegaly   Pelvis: Clinical staff was present for exam  External genitalia:  normal appearance of the external genitalia including Bartholin's and [place]'s glands.  :  urethral meatus normal;  Vaginal:  normal pink mucosa without prolapse or lesions.  Cervix:   normal appearance. Pap smear collected  Uterus:  normal size, shape and consistency.  Adnexa:  normal bimanual exam of the adnexa.        Assessment   1. Well woman exam     Plan   1. Await Pap smear results for plan of care.  Patient will return to clinic in 1 year or on an as-needed basis.      No orders of the defined types were placed in this encounter.         This note was electronically signed.    Baldo Henry MD MBA  July 27, 2018  " pt is a 32 y/o female presents to the ED with nausea and one episode of vomiting after eating an edible.

## 2019-04-29 ENCOUNTER — HOSPITAL ENCOUNTER (OUTPATIENT)
Dept: ULTRASOUND IMAGING | Facility: HOSPITAL | Age: 29
Discharge: HOME | End: 2019-04-29
Admitting: INTERNAL MEDICINE

## 2019-04-29 DIAGNOSIS — E04.9 THYROID ENLARGEMENT: ICD-10-CM

## 2019-04-29 PROCEDURE — 76536 US EXAM OF HEAD AND NECK: CPT

## 2019-05-02 ENCOUNTER — APPOINTMENT (OUTPATIENT)
Dept: MRI IMAGING | Facility: HOSPITAL | Age: 29
End: 2019-05-02

## 2019-05-09 ENCOUNTER — HOSPITAL ENCOUNTER (OUTPATIENT)
Dept: MRI IMAGING | Facility: HOSPITAL | Age: 29
Discharge: HOME | End: 2019-05-09
Admitting: INTERNAL MEDICINE

## 2019-05-09 DIAGNOSIS — H93.A9 PULSATILE TINNITUS: ICD-10-CM

## 2019-05-09 DIAGNOSIS — R42 DIZZINESS: ICD-10-CM

## 2019-05-09 DIAGNOSIS — R27.8 DYSGRAPHIA: ICD-10-CM

## 2019-05-09 PROCEDURE — A9577 INJ MULTIHANCE: HCPCS | Performed by: INTERNAL MEDICINE

## 2019-05-09 PROCEDURE — 0 GADOBENATE DIMEGLUMINE 529 MG/ML SOLUTION: Performed by: INTERNAL MEDICINE

## 2019-05-09 PROCEDURE — 70553 MRI BRAIN STEM W/O & W/DYE: CPT

## 2019-05-09 RX ADMIN — GADOBENATE DIMEGLUMINE 19 ML: 529 INJECTION, SOLUTION INTRAVENOUS at 09:21

## 2019-05-10 ENCOUNTER — TELEPHONE (OUTPATIENT)
Dept: INTERNAL MEDICINE | Facility: CLINIC | Age: 29
End: 2019-05-10

## 2019-05-10 NOTE — TELEPHONE ENCOUNTER
Yes - the brain looks good - no abnormalities seen. If she is still having symptoms, we can have her return for a follow-up to discuss more and see if other testing is needed

## 2019-05-10 NOTE — TELEPHONE ENCOUNTER
PN, she sometimes have symptoms. She made an appointment on 5/21/19 to discuss.  She also wanted to know if you can do STD testing that day.  She has an appointment with her OBGYN, but wanted to do it all here if possible.

## 2019-05-21 ENCOUNTER — OFFICE VISIT (OUTPATIENT)
Dept: INTERNAL MEDICINE | Facility: CLINIC | Age: 29
End: 2019-05-21

## 2019-05-21 VITALS
BODY MASS INDEX: 38.46 KG/M2 | WEIGHT: 209 LBS | TEMPERATURE: 98.3 F | HEIGHT: 62 IN | SYSTOLIC BLOOD PRESSURE: 124 MMHG | DIASTOLIC BLOOD PRESSURE: 62 MMHG

## 2019-05-21 DIAGNOSIS — Z11.3 SCREEN FOR STD (SEXUALLY TRANSMITTED DISEASE): ICD-10-CM

## 2019-05-21 DIAGNOSIS — R27.8 DYSGRAPHIA: ICD-10-CM

## 2019-05-21 DIAGNOSIS — R47.1 DIFFICULTY ARTICULATING WORDS: ICD-10-CM

## 2019-05-21 DIAGNOSIS — E55.9 VITAMIN D DEFICIENCY: Primary | ICD-10-CM

## 2019-05-21 DIAGNOSIS — F32.0 CURRENT MILD EPISODE OF MAJOR DEPRESSIVE DISORDER WITHOUT PRIOR EPISODE (HCC): ICD-10-CM

## 2019-05-21 LAB — HIV1+2 AB SER QL: NORMAL

## 2019-05-21 PROCEDURE — 87491 CHLMYD TRACH DNA AMP PROBE: CPT | Performed by: INTERNAL MEDICINE

## 2019-05-21 PROCEDURE — 36415 COLL VENOUS BLD VENIPUNCTURE: CPT | Performed by: INTERNAL MEDICINE

## 2019-05-21 PROCEDURE — G0432 EIA HIV-1/HIV-2 SCREEN: HCPCS | Performed by: INTERNAL MEDICINE

## 2019-05-21 PROCEDURE — 87591 N.GONORRHOEAE DNA AMP PROB: CPT | Performed by: INTERNAL MEDICINE

## 2019-05-21 PROCEDURE — 99214 OFFICE O/P EST MOD 30 MIN: CPT | Performed by: INTERNAL MEDICINE

## 2019-05-21 RX ORDER — ESCITALOPRAM OXALATE 10 MG/1
10 TABLET ORAL DAILY
Qty: 30 TABLET | Refills: 11 | Status: SHIPPED | OUTPATIENT
Start: 2019-05-21 | End: 2021-06-29 | Stop reason: SDUPTHER

## 2019-05-21 NOTE — PROGRESS NOTES
Subjective   Swapnil Hale is a 28 y.o. female.     History of Present Illness     Here for f/u on:    Neurologic symptoms-when she was last here 6 months ago she complained of difficulty writing intermittently.  We had ordered an MRI brain when she was here in November, but she just  had this done this month.  It was normal.  [facility] also [facility].  Her CBC, CMP, TSH, CRP and A1c were all normal.  Her vitamin D level was low at 13.  Her B12 level was slightly low normal at 387  She continues to notice some trouble mixing up words, and short-term memory not great.     STD screening-she would like STD screening today. She has a new partner. They do usually use a condom. No sympotms but wants to be checked    History of borderline B12 deficiency-she has received B12 injections at the [facility] where she works.    Vitamin D deficiency-she has been on high-dose vitamin D. She was restarted on the high dose D by the [facility]    Thyroid enlargement on exam-her ultrasound was essentially normal    She sees ENT this month (Dr PAGE) as she has had several ear/sinus infections. She has some chronic hearing loss in her right ear and may get hearing aid    Depression/anxiety - she has been feeling more down, tearful, and also has a lot of anxiety. Her grandmother  last year, and this has been very hard. She has not been on any antidepressants in past.  She does feel like her mood is starting to affect her work and her relationship with her children      The following portions of the patient's history were reviewed and updated as appropriate: allergies, current medications, past family history, past medical history, past social history, past surgical history and problem list.    Review of Systems   Constitutional: Negative for fever, unexpected weight gain and unexpected weight loss.   Respiratory: Negative for shortness of breath.    Cardiovascular: Negative for chest pain.  "  Skin: Negative.    Allergic/Immunologic: Negative for immunocompromised state.   Neurological: Positive for headache and memory problem. Negative for seizures and confusion.   Psychiatric/Behavioral: Positive for decreased concentration and depressed mood. Negative for agitation. The patient is nervous/anxious.        Objective   /62 (BP Location: Right arm)   Temp 98.3 °F (36.8 °C) (Temporal)   Ht 157.5 cm (62\")   Wt 94.8 kg (209 lb)   LMP 05/09/2019   BMI 38.23 kg/m²   Physical Exam   Constitutional: She is oriented to person, place, and time. She appears well-developed and well-nourished. No distress.   HENT:   Head: Normocephalic and atraumatic.   Eyes: Conjunctivae and EOM are normal. Pupils are equal, round, and reactive to light. Right eye exhibits no discharge. Left eye exhibits no discharge.   Neck: Normal range of motion. Neck supple.   Cardiovascular: Normal rate and regular rhythm.   Pulmonary/Chest: Effort normal and breath sounds normal.   Musculoskeletal: She exhibits no edema.   Neurological: She is alert and oriented to person, place, and time. No cranial nerve deficit.   Skin: Skin is warm and dry. No rash noted. She is not diaphoretic.   Psychiatric: She has a normal mood and affect. Her behavior is normal. Judgment and thought content normal.   Nursing note and vitals reviewed.        Assessment/Plan   Swapnil was seen today for results.    Diagnoses and all orders for this visit:    Vitamin D deficiency - she has been started on high dose D by another provider    Screen for STD (sexually transmitted disease)  -     HIV-1/O/2 Ag/Ab w Reflex  -     Chlamydia trachomatis, Neisseria gonorrhoeae, PCR w/ confirmation - Urine, Vagina    Dysgraphia  -     Ambulatory Referral to Neurology    Difficulty articulating words  -     Ambulatory Referral to Neurology    Current mild episode of major depressive disorder without prior episode (CMS/HCC) - we will go ahead and try lexparo. Side " effects reviewed. We will reassess in 4 weeks and see how she is doing. She has had to miss work w/ several appt, so she can call or send message through [employer]  -     escitalopram (LEXAPRO) 10 MG tablet; Take 1 tablet by mouth Daily.

## 2019-05-23 LAB
C TRACH RRNA SPEC DONR QL NAA+PROBE: NEGATIVE
N GONORRHOEA DNA SPEC QL NAA+PROBE: NEGATIVE

## 2019-06-27 PROBLEM — E55.9 VITAMIN D DEFICIENCY: Status: ACTIVE | Noted: 2019-06-27

## 2019-06-27 PROBLEM — F32.0 CURRENT MILD EPISODE OF MAJOR DEPRESSIVE DISORDER WITHOUT PRIOR EPISODE (HCC): Status: ACTIVE | Noted: 2019-06-27

## 2019-08-02 ENCOUNTER — APPOINTMENT (OUTPATIENT)
Dept: LAB | Facility: HOSPITAL | Age: 29
End: 2019-08-02

## 2019-08-02 ENCOUNTER — OFFICE VISIT (OUTPATIENT)
Dept: OBSTETRICS AND GYNECOLOGY | Facility: CLINIC | Age: 29
End: 2019-08-02

## 2019-08-02 VITALS
WEIGHT: 205.8 LBS | RESPIRATION RATE: 14 BRPM | DIASTOLIC BLOOD PRESSURE: 70 MMHG | HEIGHT: 62 IN | BODY MASS INDEX: 37.87 KG/M2 | SYSTOLIC BLOOD PRESSURE: 102 MMHG

## 2019-08-02 DIAGNOSIS — Z11.3 ROUTINE SCREENING FOR STI (SEXUALLY TRANSMITTED INFECTION): ICD-10-CM

## 2019-08-02 DIAGNOSIS — Z12.39 SCREENING BREAST EXAMINATION: ICD-10-CM

## 2019-08-02 DIAGNOSIS — Z01.419 WELL WOMAN EXAM WITH ROUTINE GYNECOLOGICAL EXAM: Primary | ICD-10-CM

## 2019-08-02 DIAGNOSIS — Z01.419 WELL WOMAN EXAM WITH ROUTINE GYNECOLOGICAL EXAM: ICD-10-CM

## 2019-08-02 LAB
HBV SURFACE AG SERPL QL IA: NORMAL
HCG INTACT+B SERPL-ACNC: <0.5 MIU/ML
HCV AB SER DONR QL: NORMAL
HIV1+2 AB SER QL: NORMAL
RPR SER QL: NORMAL

## 2019-08-02 PROCEDURE — 36415 COLL VENOUS BLD VENIPUNCTURE: CPT | Performed by: OBSTETRICS & GYNECOLOGY

## 2019-08-02 PROCEDURE — 86803 HEPATITIS C AB TEST: CPT | Performed by: OBSTETRICS & GYNECOLOGY

## 2019-08-02 PROCEDURE — 84702 CHORIONIC GONADOTROPIN TEST: CPT | Performed by: OBSTETRICS & GYNECOLOGY

## 2019-08-02 PROCEDURE — 86592 SYPHILIS TEST NON-TREP QUAL: CPT | Performed by: OBSTETRICS & GYNECOLOGY

## 2019-08-02 PROCEDURE — 99395 PREV VISIT EST AGE 18-39: CPT | Performed by: OBSTETRICS & GYNECOLOGY

## 2019-08-02 PROCEDURE — 87340 HEPATITIS B SURFACE AG IA: CPT | Performed by: OBSTETRICS & GYNECOLOGY

## 2019-08-02 PROCEDURE — G0432 EIA HIV-1/HIV-2 SCREEN: HCPCS | Performed by: OBSTETRICS & GYNECOLOGY

## 2019-08-02 RX ORDER — NORGESTIMATE AND ETHINYL ESTRADIOL 0.25-0.035
1 KIT ORAL DAILY
Qty: 28 TABLET | Refills: 12 | Status: SHIPPED | OUTPATIENT
Start: 2019-08-02 | End: 2019-10-21

## 2019-08-02 RX ORDER — METRONIDAZOLE 500 MG/1
500 TABLET ORAL 2 TIMES DAILY
Qty: 28 TABLET | Refills: 1 | Status: SHIPPED | OUTPATIENT
Start: 2019-08-02 | End: 2019-08-16

## 2019-08-02 NOTE — PROGRESS NOTES
Subjective   Chief Complaint   Patient presents with   • Gynecologic Exam     BV and irregular menses     Swapnil Hale is a 29 y.o. year old  presenting to be seen for her annual exam.     SEXUAL Hx:  She is currently sexually active.  In the past year there has not been new sexual partners.    Condoms are not typically used.  She would not like to be screened for STD's at today's exam.  Current birth control method: not using any form of contraception and does not wish to get pregnant.  She is not happy with her current method of contraception and does want to discuss alternative methods of contraception.  MENSTRUAL Hx:  Patient's last menstrual period was 2019 (exact date).  In the past 6 months her cycles have been regular, predictable and occur monthly.  Her menstrual flow is normal.   Each month on average there are roughly 0 day(s) of very heavy flow.    Intermenstrual bleeding is absent.    Post-coital bleeding is absent.  Dysmenorrhea: is not affecting her activities of daily living  PMS: is not affecting her activities of daily living  Her cycles are not a source of concern for her that she wishes to discuss today.  HEALTH Hx:  She exercises regularly: no (and has no plans to become more active).  She wears her seat belt: yes.  She has concerns about domestic violence: no.  OTHER COMPLAINTS:  Nothing else    The following portions of the patient's history were reviewed and updated as appropriate:problem list, current medications, allergies, past family history, past medical history, past social history and past surgical history.    Social History    Tobacco Use      Smoking status: Never Smoker      Smokeless tobacco: Never Used    Review of Systems  Constitutional POS: nothing reported    NEG: anorexia or night sweats   Gastointestinal POS: nothing reported    NEG: bloating, change in bowel habits, melena or reflux symptoms   Genitourinary POS: nothing reported    NEG: dysuria or  "hematuria   Integument POS: nothing reported    NEG: moles that are changing in size, shape, color or rashes   Breast POS: nothing reported    NEG: persistent breast lump, skin dimpling or nipple discharge        Objective   /70   Resp 14   Ht 157.5 cm (62\")   Wt 93.4 kg (205 lb 12.8 oz)   LMP 07/12/2019 (Exact Date)   BMI 37.64 kg/m²     General:  well developed; well nourished  no acute distress   Skin:  No suspicious lesions seen   Thyroid: normal to inspection and palpation   Breasts:  Examined in supine position  Symmetric without masses or skin dimpling  Nipples normal without inversion, lesions or discharge  There are no palpable axillary nodes   Abdomen: soft, non-tender; no masses  no umbilical or inguinal hernias are present  no hepato-splenomegaly   Pelvis: Clinical staff was present for exam  External genitalia:  normal appearance of the external genitalia including Bartholin's and [place]'s glands.  :  urethral meatus normal;  Vaginal:  normal pink mucosa without prolapse or lesions.  Cervix:   normal appearance. Pap smear collected  Uterus:  normal size, shape and consistency.  Adnexa:  normal bimanual exam of the adnexa.        Assessment   1. Well woman exam  2. Screening breast exam     Plan   1. Await Pap smear results for plan of care.  Patient will return to clinic in 1 year or on an as-needed basis.      No orders of the defined types were placed in this encounter.         This note was electronically signed.    Baldo Henry MD LINDA  August 2, 2019  "

## 2019-09-30 ENCOUNTER — OFFICE VISIT (OUTPATIENT)
Dept: INTERNAL MEDICINE | Facility: CLINIC | Age: 29
End: 2019-09-30

## 2019-09-30 VITALS
DIASTOLIC BLOOD PRESSURE: 68 MMHG | OXYGEN SATURATION: 96 % | RESPIRATION RATE: 14 BRPM | TEMPERATURE: 97.3 F | WEIGHT: 210 LBS | SYSTOLIC BLOOD PRESSURE: 102 MMHG | BODY MASS INDEX: 38.41 KG/M2 | HEART RATE: 85 BPM

## 2019-09-30 DIAGNOSIS — J01.00 ACUTE NON-RECURRENT MAXILLARY SINUSITIS: Primary | ICD-10-CM

## 2019-09-30 PROCEDURE — 99213 OFFICE O/P EST LOW 20 MIN: CPT | Performed by: INTERNAL MEDICINE

## 2019-09-30 RX ORDER — FLUCONAZOLE 150 MG/1
150 TABLET ORAL ONCE
Qty: 1 TABLET | Refills: 0 | Status: SHIPPED | OUTPATIENT
Start: 2019-09-30 | End: 2019-09-30

## 2019-09-30 RX ORDER — CEFUROXIME AXETIL 250 MG/1
250 TABLET ORAL 2 TIMES DAILY
Qty: 20 TABLET | Refills: 0 | Status: SHIPPED | OUTPATIENT
Start: 2019-09-30 | End: 2019-10-10

## 2019-09-30 NOTE — PROGRESS NOTES
Subjective   Swapnil Hale is a 29 y.o. female.     History of Present Illness     Sinus symptoms of congestion and drainage and left ear fullness over the last week. Blowing out yellow mucus.  No chest congestion and slight cough.  No sore throat.  She has tried sudafed otc but not much help    Lexapro is working well.  She is using it just every other day because is more tired if she takes it every day.    The following portions of the patient's history were reviewed and updated as appropriate: allergies, current medications, past family history, past medical history, past social history, past surgical history and problem list.    Review of Systems   Constitutional: Positive for fatigue. Negative for fever (felt hot some, but has not measured), unexpected weight gain and unexpected weight loss.   HENT: Positive for congestion and ear pain. Negative for sore throat and trouble swallowing.    Respiratory: Positive for cough (nonproductive).    Allergic/Immunologic: Negative for immunocompromised state.   Neurological: Positive for headache (over forehead).       Objective   /68   Pulse 85   Temp 97.3 °F (36.3 °C) (Temporal)   Resp 14   Wt 95.3 kg (210 lb)   SpO2 96%   BMI 38.41 kg/m²   Physical Exam   Constitutional: She is oriented to person, place, and time. She appears well-developed and well-nourished. No distress.   HENT:   Head: Normocephalic and atraumatic.   Right Ear: External ear normal.   Left Ear: External ear normal.   Mouth/Throat: Oropharynx is clear and moist. No oropharyngeal exudate.   Left TM dull .  B maxillary tenderness   Eyes: Conjunctivae and EOM are normal. Pupils are equal, round, and reactive to light. Right eye exhibits no discharge. Left eye exhibits no discharge.   Neck: Normal range of motion. Neck supple.   Cardiovascular: Normal rate and regular rhythm.   Pulmonary/Chest: Effort normal and breath sounds normal.   Musculoskeletal: She exhibits no edema.   Neurological:  She is alert and oriented to person, place, and time. No cranial nerve deficit.   Skin: Skin is warm and dry. No rash noted. She is not diaphoretic.   Psychiatric: She has a normal mood and affect. Her behavior is normal. Judgment and thought content normal.   Nursing note and vitals reviewed.        Assessment/Plan   Swapnil was seen today for nasal congestion and ear fullness.    Diagnoses and all orders for this visit:    Acute non-recurrent maxillary sinusitis - Fluids/rest. Call if no better. Can continue otc's for symptom relief  -     cefuroxime (CEFTIN) 250 MG tablet; Take 1 tablet by mouth 2 (Two) Times a Day for 10 days.  -     fluconazole (DIFLUCAN) 150 MG tablet; Take 1 tablet by mouth 1 (One) Time for 1 dose.-For yeast infection with antibiotic

## 2019-10-15 ENCOUNTER — TELEPHONE (OUTPATIENT)
Dept: OBSTETRICS AND GYNECOLOGY | Facility: CLINIC | Age: 29
End: 2019-10-15

## 2019-10-15 DIAGNOSIS — Z34.90 PREGNANCY WITH FETUS OF UNKNOWN GESTATIONAL AGE: Primary | ICD-10-CM

## 2019-10-15 NOTE — TELEPHONE ENCOUNTER
Carl pt former Aneta pt was due to have cycle on 10/7/2019 had spotting 2 weeks prior for day and a half-took 5 upt's one with faint positive others were negative. Wants to know if she can do blood work. Please advise

## 2019-10-16 ENCOUNTER — APPOINTMENT (OUTPATIENT)
Dept: LAB | Facility: HOSPITAL | Age: 29
End: 2019-10-16

## 2019-10-16 LAB — HCG INTACT+B SERPL-ACNC: <0.5 MIU/ML

## 2019-10-16 PROCEDURE — 36415 COLL VENOUS BLD VENIPUNCTURE: CPT | Performed by: OBSTETRICS & GYNECOLOGY

## 2019-10-16 PROCEDURE — 84702 CHORIONIC GONADOTROPIN TEST: CPT | Performed by: OBSTETRICS & GYNECOLOGY

## 2019-10-17 ENCOUNTER — TELEPHONE (OUTPATIENT)
Dept: OBSTETRICS AND GYNECOLOGY | Facility: CLINIC | Age: 29
End: 2019-10-17

## 2019-10-21 ENCOUNTER — OFFICE VISIT (OUTPATIENT)
Dept: OBSTETRICS AND GYNECOLOGY | Facility: CLINIC | Age: 29
End: 2019-10-21

## 2019-10-21 VITALS
DIASTOLIC BLOOD PRESSURE: 78 MMHG | HEIGHT: 62 IN | SYSTOLIC BLOOD PRESSURE: 102 MMHG | WEIGHT: 206.6 LBS | BODY MASS INDEX: 38.02 KG/M2

## 2019-10-21 DIAGNOSIS — E28.2 PCOS (POLYCYSTIC OVARIAN SYNDROME): Primary | ICD-10-CM

## 2019-10-21 PROCEDURE — 99213 OFFICE O/P EST LOW 20 MIN: CPT | Performed by: OBSTETRICS & GYNECOLOGY

## 2019-10-21 RX ORDER — SPIRONOLACTONE 50 MG/1
50 TABLET, FILM COATED ORAL 2 TIMES DAILY
Qty: 60 TABLET | Refills: 11 | Status: SHIPPED | OUTPATIENT
Start: 2019-10-21

## 2019-10-21 RX ORDER — NORGESTIMATE AND ETHINYL ESTRADIOL 0.25-0.035
1 KIT ORAL DAILY
Qty: 28 TABLET | Refills: 12 | Status: SHIPPED | OUTPATIENT
Start: 2019-10-21 | End: 2020-05-12

## 2019-10-21 RX ORDER — DIPHENHYDRAMINE HCL 25 MG
CAPSULE ORAL
COMMUNITY
Start: 2013-06-12 | End: 2020-01-15

## 2019-10-21 NOTE — PROGRESS NOTES
"Subjective   Chief Complaint   Patient presents with   • Gynecologic Exam     pt states about a month ago she was suppose to start her period on , but 2 weeks before she stated spotting. Then missed her period on , pt states she had 1 faint positive UPT the rest was neg, pt also has neg HCG. Pt states yesterday she started spotting again and it is a brownish red.      Swapnil Hale is a 29 y.o. year old .  Patient's last menstrual period was 10/21/2019 (exact date).  She presents to be seen because of intermenstrual bleeding. Patient has a longstanding history of PCOS and irregular menses Patient had been on OCP's for several months. She reports she missed a pill. She states she had some reddish-brown discharge. She subsequently discontinued the OCPs. She then had a missed menses on 10/7. Patient reports she had a faint positive UPT followed by 4 negative UPTs and a negative quantitative b-hcgs. She reports that she had some spotting yesterday as well.     The following portions of the patient's history were reviewed and updated as appropriate:current medications, allergies, past family history, past medical history, past social history and past surgical history    Social History    Tobacco Use      Smoking status: Never Smoker      Smokeless tobacco: Never Used         Objective   /78   Ht 157.5 cm (62\")   Wt 93.7 kg (206 lb 9.6 oz)   LMP 10/21/2019 (Exact Date)   Breastfeeding? No   BMI 37.79 kg/m²     Lab Review   No data reviewed    Imaging   No data reviewed        Assessment   1. PCOS  2. Intermenstrual Bleeding following missed OCP  3. Irregular Menses     Plan   1. Explained that spotting very common side effect of missed OCP pill.   2. Discussed PCOS and patients current desire to not get pregnant. Patient desires to resume OCPs. Counseled patient she may experience irregular menses for 3-4 cycles when restarting OCP. Informed her she should use back up contraception for 7 " days.  3. The importance of keeping all planned follow-up and taking all medications as prescribed was emphasized.  4. Follow up for annual exam as scheduled    New Medications Ordered This Visit   Medications   • spironolactone (ALDACTONE) 50 MG tablet     Sig: Take 1 tablet by mouth 2 (Two) Times a Day.     Dispense:  60 tablet     Refill:  11   • norgestimate-ethinyl estradiol (SPRINTEC 28) 0.25-35 MG-MCG per tablet     Sig: Take 1 tablet by mouth Daily.     Dispense:  28 tablet     Refill:  12          This note was electronically signed.    Shannon Davenport, DO  October 21, 2019    Total time spent today with Swapnil  was 20 minutes (level 3).  Off this time, 100% was spent face-to-face time coordinating care, answering her questions and counseling regarding pathophysiology of her presenting problem along with plans for any diagnositc work-up and treatment.    Note: Speech recognition transcription software may have been used to create portions of this document.  An attempt at proofreading has been made but errors in transcription could still be present.

## 2019-10-29 ENCOUNTER — OFFICE VISIT (OUTPATIENT)
Dept: INTERNAL MEDICINE | Facility: CLINIC | Age: 29
End: 2019-10-29

## 2019-10-29 VITALS
HEART RATE: 99 BPM | SYSTOLIC BLOOD PRESSURE: 108 MMHG | BODY MASS INDEX: 37.06 KG/M2 | HEIGHT: 62 IN | WEIGHT: 201.4 LBS | RESPIRATION RATE: 16 BRPM | OXYGEN SATURATION: 99 % | DIASTOLIC BLOOD PRESSURE: 62 MMHG | TEMPERATURE: 98.1 F

## 2019-10-29 DIAGNOSIS — R09.82 PND (POST-NASAL DRIP): ICD-10-CM

## 2019-10-29 DIAGNOSIS — R05.9 COUGH: ICD-10-CM

## 2019-10-29 DIAGNOSIS — R68.89 FLU-LIKE SYMPTOMS: Primary | ICD-10-CM

## 2019-10-29 DIAGNOSIS — H66.41 SUPPURATIVE OTITIS MEDIA OF RIGHT EAR, UNSPECIFIED CHRONICITY: ICD-10-CM

## 2019-10-29 DIAGNOSIS — J06.9 UPPER RESPIRATORY TRACT INFECTION, UNSPECIFIED TYPE: ICD-10-CM

## 2019-10-29 LAB
EXPIRATION DATE: NORMAL
FLUAV AG NPH QL: NEGATIVE
FLUBV AG NPH QL: NEGATIVE
INTERNAL CONTROL: NORMAL
Lab: NORMAL

## 2019-10-29 PROCEDURE — 99214 OFFICE O/P EST MOD 30 MIN: CPT | Performed by: NURSE PRACTITIONER

## 2019-10-29 PROCEDURE — 87804 INFLUENZA ASSAY W/OPTIC: CPT | Performed by: NURSE PRACTITIONER

## 2019-10-29 RX ORDER — BROMPHENIRAMINE MALEATE, PSEUDOEPHEDRINE HYDROCHLORIDE, AND DEXTROMETHORPHAN HYDROBROMIDE 2; 30; 10 MG/5ML; MG/5ML; MG/5ML
10 SYRUP ORAL 4 TIMES DAILY PRN
Qty: 300 ML | Refills: 0 | Status: SHIPPED | OUTPATIENT
Start: 2019-10-29 | End: 2020-01-15

## 2019-10-29 RX ORDER — CEFDINIR 300 MG/1
300 CAPSULE ORAL 2 TIMES DAILY
Qty: 20 CAPSULE | Refills: 0 | Status: SHIPPED | OUTPATIENT
Start: 2019-10-29 | End: 2019-11-22

## 2019-10-29 NOTE — PROGRESS NOTES
Subjective   Swapnil Hale is a 29 y.o. female    Chief Complaint   Patient presents with   • URI     sx's started last Friday. Cough, SOB slight chest congestion, chills, fatigue, nasal congestion, runny nose, diarrhea and vomiting only on Sunday.      URI    This is a new problem. Episode onset: 5 days ago. The problem has been gradually worsening. There has been no fever (but has felt chilled). Associated symptoms include congestion, coughing, headaches and wheezing. Pertinent negatives include no abdominal pain, chest pain, diarrhea, dysuria, ear pain, joint pain, joint swelling, nausea, neck pain, plugged ear sensation, rash, rhinorrhea, sinus pain, sneezing, sore throat, swollen glands or vomiting. She has tried acetaminophen (dayquil and nyquil) for the symptoms. The treatment provided no relief.        The following portions of the patient's history were reviewed and updated as appropriate: allergies, current medications, past family history, past medical history, past social history, past surgical history and problem list.    Current Outpatient Medications:   •  brompheniramine-pseudoephedrine-DM 30-2-10 MG/5ML syrup, Take 10 mL by mouth 4 (Four) Times a Day As Needed for Congestion or Cough., Disp: 300 mL, Rfl: 0  •  cefdinir (OMNICEF) 300 MG capsule, Take 1 capsule by mouth 2 (Two) Times a Day., Disp: 20 capsule, Rfl: 0  •  diphenhydrAMINE (BENADRYL ALLERGY) 25 mg capsule, Benadryl 25 mg capsule  As Directed, Disp: , Rfl:   •  escitalopram (LEXAPRO) 10 MG tablet, Take 1 tablet by mouth Daily., Disp: 30 tablet, Rfl: 11  •  Fluticasone Furoate-Vilanterol (BREO ELLIPTA) 100-25 MCG/INH inhaler, Breo Ellipta 100 mcg-25 mcg/dose powder for inhalation  Inhale 1 puff every day by inhalation route., Disp: , Rfl:   •  norgestimate-ethinyl estradiol (SPRINTEC 28) 0.25-35 MG-MCG per tablet, Take 1 tablet by mouth Daily., Disp: 28 tablet, Rfl: 12  •  spironolactone (ALDACTONE) 50 MG tablet, Take 1 tablet by mouth  2 (Two) Times a Day., Disp: 60 tablet, Rfl: 11  •  vitamin D (ERGOCALCIFEROL) 10990 units capsule capsule, , Disp: , Rfl:      Review of Systems   Constitutional: Negative for chills, fatigue and fever.   HENT: Positive for congestion. Negative for ear pain, rhinorrhea, sinus pain, sneezing and sore throat.    Respiratory: Positive for cough and wheezing. Negative for chest tightness and shortness of breath.    Cardiovascular: Negative for chest pain.   Gastrointestinal: Negative for abdominal pain, diarrhea, nausea and vomiting.   Endocrine: Negative for cold intolerance and heat intolerance.   Genitourinary: Negative for dysuria.   Musculoskeletal: Negative for arthralgias, joint pain and neck pain.   Skin: Negative for rash.   Neurological: Positive for headaches. Negative for dizziness.       Objective   Physical Exam   Constitutional: She is oriented to person, place, and time. She appears well-developed and well-nourished.   HENT:   Head: Normocephalic and atraumatic.   Right Ear: Tympanic membrane is not erythematous. A middle ear effusion is present.   Left Ear: Tympanic membrane is erythematous. A middle ear effusion is present.   Nose: Right sinus exhibits no maxillary sinus tenderness and no frontal sinus tenderness. Left sinus exhibits no maxillary sinus tenderness and no frontal sinus tenderness.   Mouth/Throat: Posterior oropharyngeal erythema present.   Eyes: Conjunctivae and EOM are normal. Pupils are equal, round, and reactive to light.   Neck: Normal range of motion.   Cardiovascular: Normal rate, regular rhythm and normal heart sounds.   Pulmonary/Chest: Effort normal and breath sounds normal.   Abdominal: Soft. Bowel sounds are normal.   Musculoskeletal: Normal range of motion.   Neurological: She is alert and oriented to person, place, and time. She has normal reflexes.   Skin: Skin is warm and dry.   Psychiatric: She has a normal mood and affect. Her behavior is normal. Judgment and thought  "content normal.     Vitals:    10/29/19 1300   BP: 108/62   Pulse: 99   Resp: 16   Temp: 98.1 °F (36.7 °C)   TempSrc: Temporal   SpO2: 99%   Weight: 91.4 kg (201 lb 6.4 oz)   Height: 157.5 cm (62.01\")         Assessment/Plan   Swapnil was seen today for uri.    Diagnoses and all orders for this visit:    Flu-like symptoms  -     Cancel: POC Urinalysis Dipstick, Automated  -     POC Influenza A / B    Upper respiratory tract infection, unspecified type  -     cefdinir (OMNICEF) 300 MG capsule; Take 1 capsule by mouth 2 (Two) Times a Day.  -     brompheniramine-pseudoephedrine-DM 30-2-10 MG/5ML syrup; Take 10 mL by mouth 4 (Four) Times a Day As Needed for Congestion or Cough.    PND (post-nasal drip)  -     brompheniramine-pseudoephedrine-DM 30-2-10 MG/5ML syrup; Take 10 mL by mouth 4 (Four) Times a Day As Needed for Congestion or Cough.    Cough  -     brompheniramine-pseudoephedrine-DM 30-2-10 MG/5ML syrup; Take 10 mL by mouth 4 (Four) Times a Day As Needed for Congestion or Cough.    Suppurative otitis media of right ear, unspecified chronicity  -     cefdinir (OMNICEF) 300 MG capsule; Take 1 capsule by mouth 2 (Two) Times a Day.      Rapid flu is negative  Meds as directed  Increase fluids  RTC if sx's worsen or do not improve           "

## 2019-11-22 ENCOUNTER — OFFICE VISIT (OUTPATIENT)
Dept: OBSTETRICS AND GYNECOLOGY | Facility: CLINIC | Age: 29
End: 2019-11-22

## 2019-11-22 VITALS
WEIGHT: 215.8 LBS | HEIGHT: 62 IN | SYSTOLIC BLOOD PRESSURE: 106 MMHG | BODY MASS INDEX: 39.71 KG/M2 | DIASTOLIC BLOOD PRESSURE: 70 MMHG

## 2019-11-22 DIAGNOSIS — N89.8 VAGINAL DISCHARGE: Primary | ICD-10-CM

## 2019-11-22 PROCEDURE — 99213 OFFICE O/P EST LOW 20 MIN: CPT | Performed by: OBSTETRICS & GYNECOLOGY

## 2019-11-22 NOTE — PROGRESS NOTES
"Subjective   Chief Complaint   Patient presents with   • Gynecologic Exam     c/o vaginal irritation, green discharge     Swapnil Hale is a 29 y.o. year old .  Patient's last menstrual period was 10/20/2019 (exact date).  She presents to be seen because of vaginal discharge. Patient reports she noticed increased vaginal discharge since last Monday followed by increased irritation since this Monday. Reports it is greenish in color. Denies vaginal odor. Sexually active on OCPs for contraception. Denies condom use. History of HSV-2, Chlamydia and Trichomonas. Denies any new sexual partners. Denies any recent douching.     OTHER THINGS SHE WANTS TO DISCUSS TODAY:  Nothing else    The following portions of the patient's history were reviewed and updated as appropriate:current medications and allergies    Social History    Tobacco Use      Smoking status: Never Smoker      Smokeless tobacco: Never Used    Review of Systems  Constitutional POS: nothing reported    NEG: anorexia or night sweats   Genitourinary POS: nothing reported    NEG: dysuria or hematuria   Gastointestinal POS: nothing reported    NEG: bloating, change in bowel habits, melena or reflux symptoms   Integument POS: nothing reported    NEG: moles that are changing in size, shape, color or rashes   Breast POS: nothing reported    NEG: persistent breast lump, skin dimpling or nipple discharge         Objective   /70   Ht 157.5 cm (62.01\")   Wt 97.9 kg (215 lb 12.8 oz)   LMP 10/20/2019 (Exact Date)   Breastfeeding? No   BMI 39.46 kg/m²     General:  well developed; well nourished  no acute distress   Skin:  Not performed.   Thyroid: not examined   Lungs:  breathing is unlabored   Heart:  Not performed.   Breasts:  Not performed.   Abdomen: soft, non-tender; no masses  no umbilical or inguinal hernias are present  no hepato-splenomegaly   Pelvis: Clinical staff was present for exam  External genitalia:  normal appearance of the external " genitalia including Bartholin's and Vidalia's glands.  :  urethral meatus normal;  Vaginal:  normal pink mucosa without prolapse or lesions. discharge present -  clear;  Cervix:  normal appearance.  Uterus:  normal size, shape and consistency.  Adnexa:  normal bimanual exam of the adnexa.     Lab Review   No data reviewed    Imaging   No data reviewed        Assessment   1. Vaginal Discharge and Irritation     Plan   1. The following tests were ordered today: STD swabs for GC, chlamydia and trichimoniasis and vaginitis panel.  It was explained to Swapnil that all lab test should be back within the one week after they are performed. She will be notified about the results, regardless of the findings. If she has not been contacted by the office within 2 weeks after the test has been performed, it is her responsibility to contact us to learn about her results.    2. The importance of keeping all planned follow-up and taking all medications as prescribed was emphasized.  3. Follow up for annual exam as scheduled.    No orders of the defined types were placed in this encounter.         This note was electronically signed.    Shannon Davenport, DO  November 22, 2019    Note: Speech recognition transcription software may have been used to create portions of this document.  An attempt at proofreading has been made but errors in transcription could still be present.

## 2019-11-27 ENCOUNTER — TELEPHONE (OUTPATIENT)
Dept: OBSTETRICS AND GYNECOLOGY | Facility: CLINIC | Age: 29
End: 2019-11-27

## 2019-11-27 NOTE — TELEPHONE ENCOUNTER
Pt called for results of vaginal swab from 11/22/19. Adfised her results are still pending per [facility].

## 2019-12-02 RX ORDER — METRONIDAZOLE 500 MG/1
500 TABLET ORAL 2 TIMES DAILY
Qty: 14 TABLET | Refills: 0 | Status: SHIPPED | OUTPATIENT
Start: 2019-12-02 | End: 2019-12-09

## 2020-01-15 ENCOUNTER — OFFICE VISIT (OUTPATIENT)
Dept: INTERNAL MEDICINE | Facility: CLINIC | Age: 30
End: 2020-01-15

## 2020-01-15 VITALS
SYSTOLIC BLOOD PRESSURE: 104 MMHG | HEART RATE: 84 BPM | RESPIRATION RATE: 18 BRPM | HEIGHT: 62 IN | TEMPERATURE: 97.4 F | DIASTOLIC BLOOD PRESSURE: 62 MMHG | OXYGEN SATURATION: 97 % | WEIGHT: 202 LBS | BODY MASS INDEX: 37.17 KG/M2

## 2020-01-15 DIAGNOSIS — J34.89 SINUS PRESSURE: ICD-10-CM

## 2020-01-15 DIAGNOSIS — B37.31 YEAST VAGINITIS: ICD-10-CM

## 2020-01-15 DIAGNOSIS — B96.89 ACUTE BACTERIAL SINUSITIS: Primary | ICD-10-CM

## 2020-01-15 DIAGNOSIS — H66.92 ACUTE LEFT OTITIS MEDIA: ICD-10-CM

## 2020-01-15 DIAGNOSIS — J01.90 ACUTE BACTERIAL SINUSITIS: Primary | ICD-10-CM

## 2020-01-15 PROCEDURE — 99213 OFFICE O/P EST LOW 20 MIN: CPT | Performed by: NURSE PRACTITIONER

## 2020-01-15 RX ORDER — FLUCONAZOLE 150 MG/1
150 TABLET ORAL ONCE
Qty: 1 TABLET | Refills: 0 | Status: SHIPPED | OUTPATIENT
Start: 2020-01-15 | End: 2020-01-15

## 2020-01-15 RX ORDER — FLUTICASONE PROPIONATE 50 MCG
2 SPRAY, SUSPENSION (ML) NASAL DAILY
Qty: 1 BOTTLE | Refills: 3 | Status: SHIPPED | OUTPATIENT
Start: 2020-01-15

## 2020-01-15 RX ORDER — CEFDINIR 300 MG/1
300 CAPSULE ORAL 2 TIMES DAILY
Qty: 20 CAPSULE | Refills: 0 | Status: SHIPPED | OUTPATIENT
Start: 2020-01-15 | End: 2020-05-12

## 2020-01-15 NOTE — PROGRESS NOTES
Subjective   Swapnil Hale is a 29 y.o. female.     Chief Complaint   Patient presents with   • URI     runny nose, congestion, dry cough, chills, wheezing when lay down.  1 week       URI    This is a new problem. The current episode started 1 to 4 weeks ago. The problem has been gradually worsening. There has been no fever. Associated symptoms include congestion, coughing, ear pain, headaches, nausea, rhinorrhea, sinus pain, sneezing, a sore throat and wheezing. Pertinent negatives include no abdominal pain, chest pain, diarrhea, neck pain, rash or vomiting. Treatments tried: bromfed DM,  The treatment provided no relief.   Earache    There is pain in both ears. This is a new problem. The current episode started in the past 7 days. The problem occurs constantly. The problem has been gradually worsening. There has been no fever. Associated symptoms include coughing, headaches, rhinorrhea and a sore throat. Pertinent negatives include no abdominal pain, diarrhea, ear discharge, hearing loss, neck pain, rash or vomiting. Treatments tried: bromfed dm  The treatment provided no relief. Her past medical history is significant for a chronic ear infection.      The following portions of the patient's history were reviewed and updated as appropriate: allergies, current medications, past family history, past medical history, past social history, past surgical history and problem list.    Review of Systems   Constitutional: Positive for appetite change, chills and diaphoresis. Negative for activity change, fatigue and fever.   HENT: Positive for congestion, ear pain, postnasal drip, rhinorrhea, sinus pressure, sinus pain, sneezing and sore throat. Negative for ear discharge, hearing loss and trouble swallowing.    Eyes: Negative for pain, redness and itching.   Respiratory: Positive for cough, shortness of breath and wheezing. Negative for chest tightness.    Cardiovascular: Negative for chest pain and palpitations.    Gastrointestinal: Positive for nausea. Negative for abdominal pain, diarrhea and vomiting.   Musculoskeletal: Negative for arthralgias, back pain and neck pain.   Skin: Negative for rash.   Allergic/Immunologic: Negative for environmental allergies.   Neurological: Positive for headaches. Negative for dizziness.       Outpatient Medications Marked as Taking for the 1/15/20 encounter (Office Visit) with Tim Laly ALEENADANDRE   Medication Sig Dispense Refill   • escitalopram (LEXAPRO) 10 MG tablet Take 1 tablet by mouth Daily. 30 tablet 11   • Fluticasone Furoate-Vilanterol (BREO ELLIPTA) 100-25 MCG/INH inhaler Breo Ellipta 100 mcg-25 mcg/dose powder for inhalation   Inhale 1 puff every day by inhalation route.     • norgestimate-ethinyl estradiol (SPRINTEC 28) 0.25-35 MG-MCG per tablet Take 1 tablet by mouth Daily. 28 tablet 12   • spironolactone (ALDACTONE) 50 MG tablet Take 1 tablet by mouth 2 (Two) Times a Day. 60 tablet 11   • [DISCONTINUED] brompheniramine-pseudoephedrine-DM 30-2-10 MG/5ML syrup Take 10 mL by mouth 4 (Four) Times a Day As Needed for Congestion or Cough. 300 mL 0   • [DISCONTINUED] diphenhydrAMINE (BENADRYL ALLERGY) 25 mg capsule Benadryl 25 mg capsule   As Directed             Objective   Physical Exam   Constitutional: She is oriented to person, place, and time. She appears well-developed and well-nourished. No distress.   HENT:   Head: Normocephalic and atraumatic.   Right Ear: External ear normal. No drainage or swelling. Tympanic membrane is not injected, not perforated and not bulging. A middle ear effusion is present. No decreased hearing is noted.   Left Ear: External ear normal. There is swelling. No drainage. Tympanic membrane is injected and bulging. Tympanic membrane is not perforated. A middle ear effusion is present. No decreased hearing is noted.   Nose: Mucosal edema and rhinorrhea present. Right sinus exhibits maxillary sinus tenderness and frontal sinus tenderness. Left sinus  "exhibits maxillary sinus tenderness and frontal sinus tenderness.   Mouth/Throat: Uvula is midline, oropharynx is clear and moist and mucous membranes are normal. No oropharyngeal exudate, posterior oropharyngeal edema or posterior oropharyngeal erythema.   Eyes: Pupils are equal, round, and reactive to light. Conjunctivae are normal. Right eye exhibits no discharge. Left eye exhibits no discharge.   Neck: Normal range of motion. Neck supple.   Cardiovascular: Normal rate, regular rhythm and normal heart sounds.   Pulmonary/Chest: Effort normal and breath sounds normal. No respiratory distress.   Abdominal: Soft. Normal appearance and bowel sounds are normal. There is no tenderness.   Musculoskeletal: Normal range of motion.   Lymphadenopathy:     She has no cervical adenopathy.   Neurological: She is alert and oriented to person, place, and time.   Skin: Skin is warm and dry. She is not diaphoretic.   Psychiatric: She has a normal mood and affect. Her behavior is normal. Judgment and thought content normal.   Nursing note and vitals reviewed.      Vitals:    01/15/20 1442   BP: 104/62   Pulse: 84   Resp: 18   Temp: 97.4 °F (36.3 °C)   SpO2: 97%   Weight: 91.6 kg (202 lb)   Height: 157.5 cm (62.01\")     Body mass index is 36.93 kg/m².        Assessment/Plan   Swapnil was seen today for uri.    Diagnoses and all orders for this visit:    Acute bacterial sinusitis  -     cefdinir (OMNICEF) 300 MG capsule; Take 1 capsule by mouth 2 (Two) Times a Day.  -     Chlorcyclizine-Pseudoephed (STAHIST AD) 25-60 MG tablet; Take 1 tablet by mouth 3 (Three) Times a Day As Needed (nasal congestion. runny nose).    Acute left otitis media  -     cefdinir (OMNICEF) 300 MG capsule; Take 1 capsule by mouth 2 (Two) Times a Day.  -     Chlorcyclizine-Pseudoephed (STAHIST AD) 25-60 MG tablet; Take 1 tablet by mouth 3 (Three) Times a Day As Needed (nasal congestion. runny nose).    Yeast vaginitis  -     fluconazole (DIFLUCAN) 150 MG " tablet; Take 1 tablet by mouth 1 (One) Time for 1 dose.    Sinus pressure  -     Chlorcyclizine-Pseudoephed (STAHIST AD) 25-60 MG tablet; Take 1 tablet by mouth 3 (Three) Times a Day As Needed (nasal congestion. runny nose).    Other orders  -     fluticasone (FLONASE) 50 MCG/ACT nasal spray; 2 sprays into the nostril(s) as directed by provider Daily.       meds as above, AE's discussed. stahist samples provided  Patient has been prescribed an antibiotic for above conditions. I have discussed side effects with them. Educated patient on antibiotic reistance and patient encouraged to complete entire course of antibiotic.   Increase rest and fluids.    Over-the-counter lozenges as needed for sore throat or cough  Warm salt water gargles if needed for sore throat  Over-the-counter Flonase nasal spray per package instructions-script sent  Avoid smoke or fumes.    May use humidifier.    Encouraged saline nasal irrigations.  Patient reports a history of vaginal yeast infections with all antibiotics and has required diflucan in the past. Patient requested dose of diflucan to have on hand.          No follow-ups on file.  I discussed my findings and recommendations with patient.  The plan of care was  discussed with patient. They verbalized understanding and agreement.  Patient was encouraged to keep me informed of any acute changes, lack of improvement, or any new concerning symptoms.       * Please note that portions of this note were completed with a voice recognition program. Efforts were made to edit the dictation but occasionally words are erroneously transcribed.

## 2020-01-22 ENCOUNTER — OFFICE VISIT (OUTPATIENT)
Dept: OBSTETRICS AND GYNECOLOGY | Facility: CLINIC | Age: 30
End: 2020-01-22

## 2020-01-22 VITALS
SYSTOLIC BLOOD PRESSURE: 110 MMHG | DIASTOLIC BLOOD PRESSURE: 64 MMHG | WEIGHT: 200.2 LBS | BODY MASS INDEX: 36.84 KG/M2 | HEIGHT: 62 IN

## 2020-01-22 DIAGNOSIS — N89.8 VAGINAL DISCHARGE: Primary | ICD-10-CM

## 2020-01-22 PROCEDURE — 99213 OFFICE O/P EST LOW 20 MIN: CPT | Performed by: OBSTETRICS & GYNECOLOGY

## 2020-01-22 NOTE — PROGRESS NOTES
"Subjective   Chief Complaint   Patient presents with   • Gynecologic Exam     thinks she has BV c/o odor and discharge     Swapnil Hale is a 29 y.o. year old .  Patient's last menstrual period was 2020 (exact date).  She presents to be seen because of increased vaginal discharge. Patient reports increased discharge since Saturday -- describes as milky white to yellowish. Denies foul odor. Denies itching. Sexually active. Using OCPs for contraception. Condoms used intermittently. Denies recent douching. Patient reports recent antibiotic use.     OTHER THINGS SHE WANTS TO DISCUSS TODAY:  Nothing else    The following portions of the patient's history were reviewed and updated as appropriate:current medications and allergies    Social History    Tobacco Use      Smoking status: Never Smoker      Smokeless tobacco: Never Used    Review of Systems  Constitutional POS: nothing reported    NEG: anorexia or night sweats   Genitourinary POS: nothing reported    NEG: dysuria or hematuria   Gastointestinal POS: nothing reported    NEG: bloating, change in bowel habits, melena or reflux symptoms   Integument POS: nothing reported    NEG: moles that are changing in size, shape, color or rashes   Breast POS: nothing reported    NEG: persistent breast lump, skin dimpling or nipple discharge         Objective   /64   Ht 157.5 cm (62.01\")   Wt 90.8 kg (200 lb 3.2 oz)   LMP 2020 (Exact Date)   Breastfeeding No   BMI 36.61 kg/m²     General:  well developed; well nourished  no acute distress   Skin:  Not performed.   Thyroid: not examined   Lungs:  breathing is unlabored   Heart:  Not performed.   Breasts:  Not performed.   Abdomen: soft, non-tender; no masses  no umbilical or inguinal hernias are present  no hepato-splenomegaly   Pelvis: Clinical staff was present for exam  External genitalia:  normal appearance of the external genitalia including Bartholin's and [place]'s glands.  :  urethral " meatus normal;  Vaginal:  normal pink mucosa without prolapse or lesions. discharge present -  yellow;  Cervix:  normal appearance.     Lab Review   No data reviewed    Imaging   No data reviewed        Assessment   1. Vaginal Discharge     Plan   The following tests were ordered today: STD swabs for GC, chlamydia and trichimoniasis and vaginitis panel.  It was explained to Swapnil that all lab test should be back within the one week after they are performed. She will be notified about the results, regardless of the findings. If she has not been contacted by the office within 2 weeks after the test has been performed, it is her responsibility to contact us to learn about her results.  1. The importance of keeping all planned follow-up and taking all medications as prescribed was emphasized.  2. Follow up for annual exam as previously scheduled.    No orders of the defined types were placed in this encounter.         This note was electronically signed.    Shannon Davenport, DO  January 22, 2020    Note: Speech recognition transcription software may have been used to create portions of this document.  An attempt at proofreading has been made but errors in transcription could still be present.

## 2020-01-30 ENCOUNTER — TELEPHONE (OUTPATIENT)
Dept: OBSTETRICS AND GYNECOLOGY | Facility: CLINIC | Age: 30
End: 2020-01-30

## 2020-02-03 ENCOUNTER — TELEPHONE (OUTPATIENT)
Dept: OBSTETRICS AND GYNECOLOGY | Facility: CLINIC | Age: 30
End: 2020-02-03

## 2020-02-03 RX ORDER — BORIC ACID
600 POWDER (GRAM) MISCELLANEOUS NIGHTLY
Qty: 14 SUPPOSITORY | Refills: 0 | Status: SHIPPED | OUTPATIENT
Start: 2020-02-03 | End: 2020-02-17

## 2020-02-03 RX ORDER — BORIC ACID
600 POWDER (GRAM) MISCELLANEOUS NIGHTLY
Qty: 14 SUPPOSITORY | Refills: 0 | Status: SHIPPED | OUTPATIENT
Start: 2020-02-03 | End: 2020-02-03

## 2020-02-03 NOTE — TELEPHONE ENCOUNTER
Pt would like to know if she could be prescribed Metformin since she has PCOS, she has heard this can help with weight loss. Advised I would forward this to Dr. Davenport for review.

## 2020-02-03 NOTE — TELEPHONE ENCOUNTER
Dr Issac Monreal from [facility] pharmacy calling to let us know that the prescription for boric acid needs to be sent to a compound pharmacy. If any questions can call.    [phone]

## 2020-02-04 NOTE — TELEPHONE ENCOUNTER
Shasta from [facility] calling and needs clarification on the prescriptio for boric acid.    Call back  [phone]

## 2020-03-06 ENCOUNTER — TELEPHONE (OUTPATIENT)
Dept: INTERNAL MEDICINE | Facility: CLINIC | Age: 30
End: 2020-03-06

## 2020-03-06 RX ORDER — IBUPROFEN 600 MG/1
600 TABLET ORAL EVERY 6 HOURS PRN
Qty: 60 TABLET | Refills: 1 | Status: SHIPPED | OUTPATIENT
Start: 2020-03-06 | End: 2020-04-13

## 2020-03-06 RX ORDER — ONDANSETRON 4 MG/1
4 TABLET, FILM COATED ORAL EVERY 8 HOURS PRN
Qty: 20 TABLET | Refills: 0 | Status: SHIPPED | OUTPATIENT
Start: 2020-03-06 | End: 2020-06-26 | Stop reason: SDUPTHER

## 2020-03-06 NOTE — TELEPHONE ENCOUNTER
Yes I sent in ibuprofen and zofran to [facility]. She might also let her OB/GYN know in case they want to see her or  recommend anything else

## 2020-03-06 NOTE — TELEPHONE ENCOUNTER
Pt is calling states her cramps are like after birth pains. Tylenol is not working. She is in so much pain and nausea. Pt wanted to know if she can get ibuprofen 600mg-800mg  And something for the nausea     Confirmed Ko    Please advise and give call [phone] (M)

## 2020-04-13 RX ORDER — IBUPROFEN 600 MG/1
TABLET ORAL
Qty: 60 TABLET | Refills: 0 | Status: SHIPPED | OUTPATIENT
Start: 2020-04-13 | End: 2020-10-13
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
## 2020-05-12 ENCOUNTER — OFFICE VISIT (OUTPATIENT)
Dept: INTERNAL MEDICINE | Facility: CLINIC | Age: 30
End: 2020-05-12

## 2020-05-12 VITALS
OXYGEN SATURATION: 99 % | SYSTOLIC BLOOD PRESSURE: 118 MMHG | TEMPERATURE: 97.6 F | HEART RATE: 106 BPM | HEIGHT: 62 IN | WEIGHT: 193.6 LBS | DIASTOLIC BLOOD PRESSURE: 72 MMHG | RESPIRATION RATE: 16 BRPM | BODY MASS INDEX: 35.63 KG/M2

## 2020-05-12 DIAGNOSIS — R35.0 URINARY FREQUENCY: Primary | ICD-10-CM

## 2020-05-12 DIAGNOSIS — F32.A ANXIETY AND DEPRESSION: ICD-10-CM

## 2020-05-12 DIAGNOSIS — J30.2 SEASONAL ALLERGIC RHINITIS, UNSPECIFIED TRIGGER: ICD-10-CM

## 2020-05-12 DIAGNOSIS — F41.9 ANXIETY AND DEPRESSION: ICD-10-CM

## 2020-05-12 DIAGNOSIS — D64.9 ANEMIA, UNSPECIFIED TYPE: ICD-10-CM

## 2020-05-12 DIAGNOSIS — E55.9 VITAMIN D DEFICIENCY: ICD-10-CM

## 2020-05-12 DIAGNOSIS — R53.83 FATIGUE, UNSPECIFIED TYPE: ICD-10-CM

## 2020-05-12 LAB
BILIRUB BLD-MCNC: NEGATIVE MG/DL
CLARITY, POC: CLEAR
COLOR UR: NORMAL
GLUCOSE UR STRIP-MCNC: NEGATIVE MG/DL
KETONES UR QL: NEGATIVE
LEUKOCYTE EST, POC: NEGATIVE
NITRITE UR-MCNC: NEGATIVE MG/ML
PH UR: 6 [PH] (ref 5–8)
PROT UR STRIP-MCNC: NEGATIVE MG/DL
RBC # UR STRIP: NEGATIVE /UL
SP GR UR: 1.03 (ref 1–1.03)
UROBILINOGEN UR QL: NORMAL

## 2020-05-12 PROCEDURE — 99214 OFFICE O/P EST MOD 30 MIN: CPT | Performed by: NURSE PRACTITIONER

## 2020-05-12 RX ORDER — PHENTERMINE HYDROCHLORIDE 37.5 MG/1
37.5 TABLET ORAL
COMMUNITY

## 2020-05-12 NOTE — PATIENT INSTRUCTIONS
Urinary Frequency, Adult  Urinary frequency means urinating more often than usual. You may urinate every 1-2 hours even though you drink a normal amount of fluid and do not have a bladder infection or condition. Although you urinate more often than normal, the total amount of urine produced in a day is normal.  With urinary frequency, you may have an urgent need to urinate often. The stress and anxiety of needing to find a bathroom quickly can make this urge worse. This condition may go away on its own or you may need treatment at home. Home treatment may include bladder training, exercises, taking medicines, or making changes to your diet.  Follow these instructions at home:  Bladder health    · Keep a bladder diary if told by your health care provider. Keep track of:  ? What you eat and drink.  ? How often you urinate.  ? How much you urinate.  · Follow a bladder training program if told by your health care provider. This may include:  ? Learning to delay going to the bathroom.  ? Double urinating (voiding). This helps if you are not completely emptying your bladder.  ? Scheduled voiding.  · Do Kegel exercises as told by your health care provider. Kegel exercises strengthen the muscles that help control urination, which may help the condition.  Eating and drinking  · If told by your health care provider, make diet changes, such as:  ? Avoiding caffeine.  ? Drinking fewer fluids, especially alcohol.  ? Not drinking in the evening.  ? Avoiding foods or drinks that may irritate the bladder. These include coffee, tea, soda, artificial sweeteners, citrus, tomato-based foods, and chocolate.  ? Eating foods that help prevent or ease constipation. Constipation can make this condition worse. Your health care provider may recommend that you:  § Drink enough fluid to keep your urine pale yellow.  § Take over-the-counter or prescription medicines.  § Eat foods that are high in fiber, such as beans, whole grains, and fresh  fruits and vegetables.  § Limit foods that are high in fat and processed sugars, such as fried or sweet foods.  General instructions  · Take over-the-counter and prescription medicines only as told by your health care provider.  · Keep all follow-up visits as told by your health care provider. This is important.  Contact a health care provider if:  · You start urinating more often.  · You feel pain or irritation when you urinate.  · You notice blood in your urine.  · Your urine looks cloudy.  · You develop a fever.  · You begin vomiting.  Get help right away if:  · You are unable to urinate.  Summary  · Urinary frequency means urinating more often than usual. With urinary frequency, you may urinate every 1-2 hours even though you drink a normal amount of fluid and do not have a bladder infection or other bladder condition.  · Your health care provider may recommend that you keep a bladder diary, follow a bladder training program, or make dietary changes.  · If told by your health care provider, do Kegel exercises to strengthen the muscles that help control urination.  · Take over-the-counter and prescription medicines only as told by your health care provider.  · Contact a health care provider if your symptoms do not improve or get worse.  This information is not intended to replace advice given to you by your health care provider. Make sure you discuss any questions you have with your health care provider.  Document Released: 10/14/2010 Document Revised: 06/27/2019 Document Reviewed: 06/27/2019  [employer] Interactive Patient Education © 2020 [employer]

## 2020-06-26 ENCOUNTER — OFFICE VISIT (OUTPATIENT)
Dept: INTERNAL MEDICINE | Facility: CLINIC | Age: 30
End: 2020-06-26

## 2020-06-26 VITALS
BODY MASS INDEX: 34.41 KG/M2 | SYSTOLIC BLOOD PRESSURE: 114 MMHG | TEMPERATURE: 97.3 F | HEIGHT: 62 IN | DIASTOLIC BLOOD PRESSURE: 70 MMHG | RESPIRATION RATE: 16 BRPM | OXYGEN SATURATION: 98 % | HEART RATE: 106 BPM | WEIGHT: 187 LBS

## 2020-06-26 DIAGNOSIS — R42 DIZZINESS: ICD-10-CM

## 2020-06-26 DIAGNOSIS — H66.92 ACUTE BACTERIAL MIDDLE EAR INFECTION, LEFT: Primary | ICD-10-CM

## 2020-06-26 DIAGNOSIS — R14.0 BLOATING: ICD-10-CM

## 2020-06-26 DIAGNOSIS — R11.0 NAUSEA: ICD-10-CM

## 2020-06-26 PROCEDURE — 99214 OFFICE O/P EST MOD 30 MIN: CPT | Performed by: NURSE PRACTITIONER

## 2020-06-26 RX ORDER — MECLIZINE HYDROCHLORIDE 25 MG/1
25 TABLET ORAL 3 TIMES DAILY PRN
Qty: 30 TABLET | Refills: 0 | Status: SHIPPED | OUTPATIENT
Start: 2020-06-26

## 2020-06-26 RX ORDER — FLUCONAZOLE 150 MG/1
150 TABLET ORAL ONCE
Qty: 1 TABLET | Refills: 0 | Status: SHIPPED | OUTPATIENT
Start: 2020-06-26 | End: 2020-06-26

## 2020-06-26 RX ORDER — SIMETHICONE 20 MG/.3ML
40 EMULSION ORAL 4 TIMES DAILY PRN
COMMUNITY

## 2020-06-26 RX ORDER — ONDANSETRON 4 MG/1
4 TABLET, FILM COATED ORAL EVERY 8 HOURS PRN
Qty: 30 TABLET | Refills: 0 | Status: SHIPPED | OUTPATIENT
Start: 2020-06-26

## 2020-06-26 RX ORDER — CEFUROXIME AXETIL 250 MG/1
250 TABLET ORAL 2 TIMES DAILY
Qty: 20 TABLET | Refills: 0 | Status: SHIPPED | OUTPATIENT
Start: 2020-06-26 | End: 2021-01-25

## 2020-06-26 NOTE — PROGRESS NOTES
Subjective   Swapnil Hale is a 29 y.o. female.     Chief Complaint   Patient presents with   • Dizziness     ongoing over 1 month, Nausea and dizziness.  Currently taking Allegra       Dizziness   This is a new problem. The current episode started more than 1 month ago. The problem occurs intermittently. The problem has been waxing and waning. Associated symptoms include nausea and vertigo. Pertinent negatives include no abdominal pain, anorexia, arthralgias, change in bowel habit, chest pain, chills, congestion, coughing, diaphoresis, fatigue, fever, headaches, joint swelling, myalgias, neck pain, numbness, rash, sore throat, swollen glands, urinary symptoms, visual change, vomiting or weakness. Associated symptoms comments: Ears feel full. The symptoms are aggravated by twisting and walking. Treatments tried: allegra. The treatment provided mild relief.   Ear Fullness    There is pain in both ears. This is a recurrent problem. The current episode started 1 to 4 weeks ago. Episode frequency: intermittently. There has been no fever. The patient is experiencing no pain. Pertinent negatives include no abdominal pain, coughing, diarrhea, ear discharge, headaches, hearing loss, neck pain, rash, rhinorrhea, sore throat or vomiting. Treatments tried: allegra. The treatment provided no relief. Her past medical history is significant for a chronic ear infection.   Nausea   This is a new problem. The current episode started 1 to 4 weeks ago. The problem occurs intermittently. The problem has been waxing and waning. Associated symptoms include nausea and vertigo. Pertinent negatives include no abdominal pain, anorexia, arthralgias, change in bowel habit, chest pain, chills, congestion, coughing, diaphoresis, fatigue, fever, headaches, joint swelling, myalgias, neck pain, numbness, rash, sore throat, swollen glands, urinary symptoms, visual change, vomiting or weakness. Associated symptoms comments: Occasional bloating.  Exacerbated by: dizziness. Treatments tried: zofran. The treatment provided significant relief.          The following portions of the patient's history were reviewed and updated as appropriate: allergies, current medications, past family history, past medical history, past social history, past surgical history and problem list.        Review of Systems   Constitutional: Negative for chills, diaphoresis, fatigue and fever.   HENT: Positive for ear pain (no pain, just fullness). Negative for congestion, ear discharge, hearing loss, nosebleeds, postnasal drip, rhinorrhea, sinus pressure, sneezing, sore throat and swollen glands.    Respiratory: Negative for cough.    Cardiovascular: Negative for chest pain.   Gastrointestinal: Positive for nausea. Negative for abdominal pain, anorexia, change in bowel habit, diarrhea and vomiting.        C/O occasional bloating   Musculoskeletal: Negative for arthralgias, joint swelling, myalgias and neck pain.   Skin: Negative for rash.   Neurological: Positive for dizziness and vertigo. Negative for tremors, seizures, syncope, speech difficulty, weakness, numbness, headache and confusion.           Outpatient Medications Marked as Taking for the 6/26/20 encounter (Office Visit) with Laly Dumont APRN   Medication Sig Dispense Refill   • escitalopram (LEXAPRO) 10 MG tablet Take 1 tablet by mouth Daily. 30 tablet 11   • fluticasone (FLONASE) 50 MCG/ACT nasal spray 2 sprays into the nostril(s) as directed by provider Daily. 1 bottle 3   • Fluticasone Furoate-Vilanterol (BREO ELLIPTA) 100-25 MCG/INH inhaler Breo Ellipta 100 mcg-25 mcg/dose powder for inhalation   Inhale 1 puff every day by inhalation route.     • ibuprofen (ADVIL,MOTRIN) 600 MG tablet TAKE ONE TABLET BY MOUTH EVERY 6 HOURS AS NEEDED FOR MILD OR MODERATE PAIN 60 tablet 0   • ondansetron (Zofran) 4 MG tablet Take 1 tablet by mouth Every 8 (Eight) Hours As Needed for Nausea or Vomiting. 30 tablet 0   • phentermine  "(ADIPEX-P) 37.5 MG tablet Take 37.5 mg by mouth Every Morning Before Breakfast.     • simethicone (MYLICON) 40 MG/0.6ML drops Take 40 mg by mouth 4 (Four) Times a Day As Needed for Flatulence.     • spironolactone (ALDACTONE) 50 MG tablet Take 1 tablet by mouth 2 (Two) Times a Day. 60 tablet 11   • [DISCONTINUED] ondansetron (ZOFRAN) 4 MG tablet Take 1 tablet by mouth Every 8 (Eight) Hours As Needed for Nausea or Vomiting. 20 tablet 0         Objective   Physical Exam   Constitutional: She is oriented to person, place, and time. She appears well-developed and well-nourished.   HENT:   Head: Normocephalic and atraumatic.   Right Ear: Hearing, tympanic membrane, external ear and ear canal normal.   Left Ear: Hearing, external ear and ear canal normal. No swelling or tenderness. Tympanic membrane is injected, erythematous and bulging. A middle ear effusion is present.   Eyes: Pupils are equal, round, and reactive to light. Conjunctivae are normal.   Neck: Normal range of motion.   Cardiovascular: Normal rate, regular rhythm and normal heart sounds.   Pulmonary/Chest: Effort normal and breath sounds normal.   Abdominal: Soft. Normal appearance and bowel sounds are normal. There is no tenderness.   Musculoskeletal: Normal range of motion.   Neurological: She is alert and oriented to person, place, and time.   Skin: Skin is warm and dry.   Psychiatric: She has a normal mood and affect. Her behavior is normal. Judgment and thought content normal.       Vitals:    06/26/20 0840   BP: 114/70   Pulse: 106   Resp: 16   Temp: 97.3 °F (36.3 °C)   SpO2: 98%   Weight: 84.8 kg (187 lb)   Height: 157.5 cm (62.01\")     Body mass index is 34.19 kg/m².        Assessment/Plan       Diagnoses and all orders for this visit:    1. Acute bacterial middle ear infection, left (Primary)  -     cefuroxime (Ceftin) 250 MG tablet; Take 1 tablet by mouth 2 (Two) Times a Day.  Dispense: 20 tablet; Refill: 0    2. Dizziness  -     meclizine " (ANTIVERT) 25 MG tablet; Take 1 tablet by mouth 3 (Three) Times a Day As Needed for Dizziness.  Dispense: 30 tablet; Refill: 0    3. Nausea  -     ondansetron (Zofran) 4 MG tablet; Take 1 tablet by mouth Every 8 (Eight) Hours As Needed for Nausea or Vomiting.  Dispense: 30 tablet; Refill: 0    4. Bloating    Other orders  -     fluconazole (Diflucan) 150 MG tablet; Take 1 tablet by mouth 1 (One) Time for 1 dose.  Dispense: 1 tablet; Refill: 0    ceftin for AOM. AE's discusses. Patient has been prescribed an antibiotic for above conditions. I have discussed side effects with them. Educated patient on antibiotic reistance and patient encouraged to complete entire course of antibiotic.   Encouraged daily use of her Flonase and antihistamine. May need to see ENT if AOM continues to recur.   zofran PRN nausea.   Meclizine PRN dizziness.   Patient reports a history of vaginal yeast infections with all antibiotics and has required diflucan in the past. Patient requested dose of diflucan to have on hand.   Can try OTC probiotic for bloating. FU for full eval if ineffective.     Return if symptoms worsen or fail to improve.     I discussed my findings,recommendations, and plan of care was with the patient. They verbalized understanding and agreement.  Patient was encouraged to keep me informed of any acute changes, lack of improvement, or any new concerning symptoms.       * Please note that portions of this note were completed with a voice recognition program. Efforts were made to edit the dictation but occasionally words are erroneously transcribed.

## 2020-09-02 ENCOUNTER — TELEPHONE (OUTPATIENT)
Dept: INTERNAL MEDICINE | Facility: CLINIC | Age: 30
End: 2020-09-02

## 2020-09-02 NOTE — TELEPHONE ENCOUNTER
PT CALLED AND WANTS TO  AVS FOR HER OFFICE VISIT TOMORROW WITH ANOTHER PHYSICIAN; SHE CAN COME BY IN THE MORNING [id number] AROUND 10A    [facility] [phone]

## 2020-10-13 RX ORDER — IBUPROFEN 600 MG/1
TABLET ORAL
Qty: 60 TABLET | Refills: 5 | Status: SHIPPED | OUTPATIENT
Start: 2020-10-13

## 2020-10-13 NOTE — TELEPHONE ENCOUNTER
If she can schedule a physical next year.    PN and verbalized understanding.  Appointment has been made for 2/25/21.

## 2021-01-25 ENCOUNTER — OFFICE VISIT (OUTPATIENT)
Dept: INTERNAL MEDICINE | Facility: CLINIC | Age: 31
End: 2021-01-25

## 2021-01-25 VITALS
HEART RATE: 90 BPM | WEIGHT: 190.6 LBS | BODY MASS INDEX: 35.07 KG/M2 | SYSTOLIC BLOOD PRESSURE: 104 MMHG | OXYGEN SATURATION: 100 % | HEIGHT: 62 IN | DIASTOLIC BLOOD PRESSURE: 58 MMHG | TEMPERATURE: 97.5 F

## 2021-01-25 DIAGNOSIS — H61.23 BILATERAL IMPACTED CERUMEN: Primary | ICD-10-CM

## 2021-01-25 PROCEDURE — 99213 OFFICE O/P EST LOW 20 MIN: CPT | Performed by: NURSE PRACTITIONER

## 2021-01-25 PROCEDURE — 69209 REMOVE IMPACTED EAR WAX UNI: CPT | Performed by: NURSE PRACTITIONER

## 2021-01-25 NOTE — PROGRESS NOTES
Chief Complaint  blood in ear (right ear this morning was cleaning and noticed a little blood on the q tip)    Subjective          Swapnil Hale presents to [facility] for   Ear Drainage   There is pain in the right ear. This is a new problem. The current episode started today. Episode frequency: once. The problem has been resolved. There has been no fever. The patient is experiencing no pain. Associated symptoms include ear discharge ( Noted a small amount of blood (dark red) on Q-tip this morning)). Pertinent negatives include no abdominal pain, coughing, diarrhea, headaches, hearing loss, neck pain, rash, rhinorrhea, sore throat or vomiting. She has tried nothing for the symptoms. Her past medical history is significant for a chronic ear infection and hearing loss (deaf in right ear). There is no history of a tympanostomy tube.   She has had recurrent issues with ear infections with minimal or no symptoms in the past.  She wanted to come in to be sure that she was not having an ear infection since she did note the blood.      No Known Allergies  Current Outpatient Medications on File Prior to Visit   Medication Sig Dispense Refill   • escitalopram (LEXAPRO) 10 MG tablet Take 1 tablet by mouth Daily. 30 tablet 11   • fluticasone (FLONASE) 50 MCG/ACT nasal spray 2 sprays into the nostril(s) as directed by provider Daily. 1 bottle 3   • Fluticasone Furoate-Vilanterol (BREO ELLIPTA) 100-25 MCG/INH inhaler Breo Ellipta 100 mcg-25 mcg/dose powder for inhalation   Inhale 1 puff every day by inhalation route.     • ibuprofen (ADVIL,MOTRIN) 600 MG tablet TAKE ONE TABLET BY MOUTH EVERY 6 HOURS AS NEEDED FOR MILD OR MODERATE PAIN 60 tablet 5   • meclizine (ANTIVERT) 25 MG tablet Take 1 tablet by mouth 3 (Three) Times a Day As Needed for Dizziness. 30 tablet 0   • ondansetron (Zofran) 4 MG tablet Take 1 tablet by mouth Every 8 (Eight) Hours As Needed for Nausea or Vomiting. 30 tablet 0  "  • phentermine (ADIPEX-P) 37.5 MG tablet Take 37.5 mg by mouth Every Morning Before Breakfast.     • simethicone (MYLICON) 40 MG/0.6ML drops Take 40 mg by mouth 4 (Four) Times a Day As Needed for Flatulence.     • spironolactone (ALDACTONE) 50 MG tablet Take 1 tablet by mouth 2 (Two) Times a Day. 60 tablet 11   • [DISCONTINUED] cefuroxime (Ceftin) 250 MG tablet Take 1 tablet by mouth 2 (Two) Times a Day. 20 tablet 0     No current facility-administered medications on file prior to visit.          The following portions of the patient's history were reviewed and updated as appropriate: allergies, current medications, past family history, past medical history, past social history, past surgical history and problem list and are available for review within electronic record    Objective     Vital Signs:   /58 (BP Location: Right arm, Patient Position: Sitting)   Pulse 90   Temp 97.5 °F (36.4 °C) (Infrared)   Ht 157.5 cm (62.01\")   Wt 86.5 kg (190 lb 9.6 oz)   SpO2 100%   BMI 34.85 kg/m²         Physical Exam  Constitutional:       Appearance: Normal appearance. She is well-developed.   HENT:      Head: Normocephalic and atraumatic.      Ears:      Comments: Bilateral TMs without erythema or effusion.  There is a moderate amount of cerumen in the bilateral canals.  Right canal is slightly erythemic but there is no evidence of infection or site of bleeding.  Eyes:      Conjunctiva/sclera: Conjunctivae normal.      Pupils: Pupils are equal, round, and reactive to light.   Neck:      Musculoskeletal: Normal range of motion.   Cardiovascular:      Rate and Rhythm: Normal rate.   Pulmonary:      Effort: Pulmonary effort is normal.   Abdominal:      Palpations: Abdomen is soft.      Tenderness: There is no abdominal tenderness.   Musculoskeletal: Normal range of motion.   Skin:     General: Skin is warm and dry.   Neurological:      Mental Status: She is alert and oriented to person, place, and time. "   Psychiatric:         Behavior: Behavior normal.         Thought Content: Thought content normal.         Judgment: Judgment normal.          Result Review :              Ear Cerumen Removal    Date/Time: 1/25/2021 11:30 AM  Performed by: Jocelin Babin MA  Authorized by: Laly Dumont APRN     Anesthesia:  Local Anesthetic: none  Location details: right ear and left ear  Patient tolerance: patient tolerated the procedure well with no immediate complications  Procedure type: irrigation   Sedation:  Patient sedated: no                  Assessment and Plan      Diagnoses and all orders for this visit:    1. Bilateral impacted cerumen (Primary)  -     Ear Cerumen Removal      Bilateral ears irrigated with success.  Patient tolerated well.  There is no evidence of acute otitis externa or media.  Advised to avoid foreign objects in ear such as Q-tips.  If she were to need something for cerumen expulsion would recommend Debrox drops over-the-counter.      Follow Up     Patient was given instructions and counseling regarding her condition or for health maintenance advice. Please see specific information pulled into the AVS if appropriate.   Any new medication's adverse effects have been discussed in detail with patient  Patient was encouraged to keep me informed of any acute changes, lack of improvement, or any new concerning symptoms.    Return if symptoms worsen or fail to improve.    * Please note that portions of this note were completed with a voice recognition program. Efforts were made to edit the dictation but occasionally words are erroneously transcribed.

## 2021-03-13 DIAGNOSIS — R11.0 NAUSEA: ICD-10-CM

## 2021-03-14 NOTE — TELEPHONE ENCOUNTER
Last Office Visit: 01/25/2021 for acute visit  Next Office Visit: None scheduled   Labs completed in past 6 months? no  Labs completed in past year? yes    Last Refill Date: 06/26/2020  Quantity: 30  Refills: 0    Pharmacy:  [facility] - [place], [place] - [address] - [phone]  - [phone]    [address] [place] [address]   Phone:  [phone]  Fax:  [phone]

## 2021-03-15 RX ORDER — ONDANSETRON 4 MG/1
TABLET, FILM COATED ORAL
Qty: 30 TABLET | Refills: 0 | OUTPATIENT
Start: 2021-03-15

## 2021-06-29 DIAGNOSIS — F32.0 CURRENT MILD EPISODE OF MAJOR DEPRESSIVE DISORDER WITHOUT PRIOR EPISODE (HCC): ICD-10-CM

## 2021-06-29 RX ORDER — ESCITALOPRAM OXALATE 10 MG/1
10 TABLET ORAL DAILY
Qty: 30 TABLET | Refills: 5 | Status: SHIPPED | OUTPATIENT
Start: 2021-06-29

## 2022-08-02 NOTE — TELEPHONE ENCOUNTER
Anesthesia Pre Eval Note    Anesthesia ROS/Med Hx    Overall Review:  EKG was reviewed       Cardiovascular Review:    Positive for hypertension    End/Other Review:    Positive for arthritis  Additional Results:  EKG:  Encounter Date: 08/02/22  -Electrocardiogram 12-Lead:        Result                      Value                           Systolic Blood Pressure     146                             Diastolic Blood Pressu*     69                              Ventricular Rate EKG/M*     72                              Atrial Rate (BPM)           72                              AL-Interval (MSEC)          198                             QRS-Interval (MSEC)         78                              QT-Interval (MSEC)          378                             QTc                         414                             P Axis (Degrees)            54                              R Axis (Degrees)            25                              T Axis (Degrees)            23                              REPORT TEXT                                             Normal sinus rhythm   Cannot rule out   Anterior infarct   , age undetermined   Abnormal ECG   When compared with ECG of   22-MAY-2022 03:27,   Sinus rhythm   has replaced   Atrial fibrillation   Nonspecific T wave abnormality, improved in   Inferior leads   Nonspecific T wave abnormality no longer evident in   Anterolateral leads          Relevant Problems   No relevant active problems       Physical Exam     Airway   Mallampati: II  TM Distance: >3 FB  Neck ROM: Full    Cardiovascular  Cardiovascular exam normal    Dental Exam    Patient has:  Upper dentures and Lower dentures    Pulmonary Exam  Pulmonary exam normal    Abdominal Exam  Abdominal exam normal      Anesthesia Plan:    ASA Status: 3  Anesthesia Type: General    Induction: Intravenous  Preferred Airway Type: ETT  Premedication: None    Checklist  Reviewed: EKG, Lab Results, Problem list, Medications, Allergies, Beta  Pt was notified oh her private voicemail.   Blocker Status, NPO Status, Patient Summary, Past Med History and DNR Status  Consent/Risks Discussed Statement:  The proposed anesthetic plan, including its risks and benefits, have been discussed with the Patient along with the risks and benefits of alternatives. Questions were encouraged and answered and the patient and/or representative understands and agrees to proceed.        I discussed with the patient (and/or patient's legal representative) the risks and benefits of the proposed anesthesia plan, General, which may include services performed by other anesthesia providers.    Alternative anesthesia plans, if available, were reviewed with the patient (and/or patient's legal representative). Discussion has been held with the patient (and/or patient's legal representative) regarding risks of anesthesia, which include emergent situations that may require change in anesthesia plan.  The patient (and/or patient's legal representative) has indicated understanding, his/her questions have been answered, and he/she wishes to proceed with the planned anesthetic.      Informed Consent for Blood: Consented

## 2023-08-29 NOTE — PROGRESS NOTES
Addended by: JORDEN CRUZ on: 8/29/2023 12:27 PM     Modules accepted: Orders     Subjective   Swapnil Hale is a 28 y.o. female.     History of Present Illness     She has noticed trouble writing over the last 2 weeks- she feels like her hand won't do what her brain is telling it to write. No major change in speaking, though occasionally feels tongue-tied.  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 Feels like her work performance has been ok despite this  About 4 years ago after she had her son she noticed similar symptoms that were worse, but did not get them checked out. She also had pulsatile tinnitus which has resolved  She does feel like stress may contribute to her symptoms, and has had quite a bit of stress    HAs - she is getting 2-3/week. These tend to happen late am usually. She will usually take ibuprofen and improves. Feels it mostly in the forehead. She started having HAs 2 yrs ago after her 2nd son was born. BP was apparently high after childbirth but improved and never needed BP meds  HAs are not severe, and usually doesn't have to miss work. Prior to HA she may have some blurry vision, but no flashing lights. Tends to get nausea w/ the HAs    Dizziness intermittently over the last 6m - lasts just a few seconds. Can even be sitting. Not daily.     She is on vitamin B12 injections through Dr Aster Bowser (where she works) as B12 was a little low about 4 months ago - she has had about 3 b12 shots.  Last b12 was about 3 weeks. She is also on vitamin D high dose through Dr Bowser as well    The following portions of the patient's history were reviewed and updated as appropriate: allergies, current medications, past family history, past medical history, past social history, past surgical history and problem list.    Review of Systems   HENT: Positive for hearing loss (right sided).    Eyes: Positive for visual disturbance.   Gastrointestinal: Positive for nausea and GERD (prilosec prn). Negative for constipation, diarrhea and vomiting.   Genitourinary:        Periods  "irregular   Allergic/Immunologic: Negative for immunocompromised state.   Neurological: Positive for dizziness, light-headedness, numbness (in hands at night, h/o CTS) and headache. Negative for speech difficulty.   Psychiatric/Behavioral: Positive for stress. Negative for dysphoric mood and depressed mood.       Objective    Vitals:    11/16/18 1255   BP: 90/60   BP Location: Left arm   Pulse: 67   Temp: 99.1 °F (37.3 °C)   TempSrc: Temporal   SpO2: 98%   Weight: 95.1 kg (209 lb 9.6 oz)   Height: 157.5 cm (62\")     Physical Exam   Constitutional: She is oriented to person, place, and time. She appears well-developed and well-nourished. No distress.   HENT:   Head: Normocephalic and atraumatic.   Right Ear: External ear normal.   Left Ear: External ear normal.   Mouth/Throat: No oropharyngeal exudate.   B TM normal   Eyes: EOM are normal. Pupils are equal, round, and reactive to light.   Neck: Normal range of motion. Neck supple. Thyromegaly (? right side - nodule) present.   Cardiovascular: Normal rate and regular rhythm.   Pulmonary/Chest: Effort normal and breath sounds normal.   Musculoskeletal: She exhibits no edema or deformity.   Neurological: She is alert and oriented to person, place, and time. She displays normal reflexes. No cranial nerve deficit. She exhibits normal muscle tone. Coordination normal.   Skin: Skin is warm and dry. No rash noted. She is not diaphoretic.   Psychiatric: She has a normal mood and affect. Her behavior is normal. Judgment and thought content normal.         Assessment/Plan   Swapnil was seen today for writing, headache, nausea, dizziness and hypertension.    Diagnoses and all orders for this visit:    Dysgraphia/memory disturbance/HA/dizziness - multiple neurological symptoms - will go ahead and get MRI head and make sure ok. F/u here after MRI  -     Comprehensive Metabolic Panel  -     CBC & Differential  -     TSH  -     Vitamin B12  -     MRI Brain With & Without Contrast; " Future    Fatigue, unspecified type  -     TSH    Thyroid nodule  -     T4, Free  -     US Thyroid; Future    Headache, unspecified headache type  -     MRI Brain With & Without Contrast; Future    Pulsatile tinnitus  -     MRI Brain With & Without Contrast; Future          Prev - she got hep A and flu